# Patient Record
Sex: FEMALE | Race: WHITE | NOT HISPANIC OR LATINO | Employment: PART TIME | URBAN - METROPOLITAN AREA
[De-identification: names, ages, dates, MRNs, and addresses within clinical notes are randomized per-mention and may not be internally consistent; named-entity substitution may affect disease eponyms.]

---

## 2018-01-19 ENCOUNTER — TRANSFERRED RECORDS (OUTPATIENT)
Dept: HEALTH INFORMATION MANAGEMENT | Facility: CLINIC | Age: 18
End: 2018-01-19

## 2018-07-31 ENCOUNTER — TRANSFERRED RECORDS (OUTPATIENT)
Dept: HEALTH INFORMATION MANAGEMENT | Facility: CLINIC | Age: 18
End: 2018-07-31

## 2018-08-14 ENCOUNTER — TRANSFERRED RECORDS (OUTPATIENT)
Dept: HEALTH INFORMATION MANAGEMENT | Facility: CLINIC | Age: 18
End: 2018-08-14

## 2022-06-03 ENCOUNTER — LAB REQUISITION (OUTPATIENT)
Dept: LAB | Facility: CLINIC | Age: 22
End: 2022-06-03

## 2022-06-03 PROCEDURE — 86481 TB AG RESPONSE T-CELL SUSP: CPT | Performed by: INTERNAL MEDICINE

## 2022-06-06 LAB
GAMMA INTERFERON BACKGROUND BLD IA-ACNC: 0.03 IU/ML
M TB IFN-G BLD-IMP: NEGATIVE
M TB IFN-G CD4+ BCKGRND COR BLD-ACNC: 9.97 IU/ML
MITOGEN IGNF BCKGRD COR BLD-ACNC: 0 IU/ML
MITOGEN IGNF BCKGRD COR BLD-ACNC: 0 IU/ML
QUANTIFERON MITOGEN: 10 IU/ML
QUANTIFERON NIL TUBE: 0.03 IU/ML
QUANTIFERON TB1 TUBE: 0.03 IU/ML
QUANTIFERON TB2 TUBE: 0.03

## 2022-06-28 ENCOUNTER — OFFICE VISIT (OUTPATIENT)
Dept: ALLERGY | Facility: CLINIC | Age: 22
End: 2022-06-28
Payer: OTHER GOVERNMENT

## 2022-06-28 VITALS — RESPIRATION RATE: 16 BRPM | WEIGHT: 139.8 LBS | HEART RATE: 79 BPM | OXYGEN SATURATION: 99 %

## 2022-06-28 DIAGNOSIS — J30.89 ALLERGIC RHINITIS DUE TO DUST MITE: Primary | ICD-10-CM

## 2022-06-28 DIAGNOSIS — J30.1 SEASONAL ALLERGIC RHINITIS DUE TO POLLEN: ICD-10-CM

## 2022-06-28 PROCEDURE — 95004 PERQ TESTS W/ALRGNC XTRCS: CPT | Performed by: ALLERGY & IMMUNOLOGY

## 2022-06-28 PROCEDURE — 99204 OFFICE O/P NEW MOD 45 MIN: CPT | Mod: 25 | Performed by: ALLERGY & IMMUNOLOGY

## 2022-06-28 RX ORDER — CETIRIZINE HYDROCHLORIDE 10 MG/1
10 TABLET ORAL AT BEDTIME
COMMUNITY
End: 2023-08-22

## 2022-06-28 RX ORDER — SPIRONOLACTONE 25 MG/1
25 TABLET ORAL AT BEDTIME
COMMUNITY
End: 2022-10-03

## 2022-06-28 RX ORDER — ESCITALOPRAM OXALATE 5 MG/1
5 TABLET ORAL AT BEDTIME
COMMUNITY
End: 2022-10-03

## 2022-06-28 RX ORDER — MONTELUKAST SODIUM 10 MG/1
10 TABLET ORAL AT BEDTIME
Qty: 30 TABLET | Refills: 1 | Status: SHIPPED | OUTPATIENT
Start: 2022-06-28 | End: 2022-08-25

## 2022-06-28 RX ORDER — SPIRONOLACTONE 50 MG/1
50 TABLET, FILM COATED ORAL EVERY MORNING
COMMUNITY
End: 2022-10-03

## 2022-06-28 NOTE — PATIENT INSTRUCTIONS
Astelin 2 sprays each nostril twice neel y    Montelukast 10 mg for 1 month    Can consider allergy shots

## 2022-06-28 NOTE — LETTER
6/28/2022         RE: Ivet Ortiz  [address]  [place] [address]        Dear Colleague,    Thank you for referring your patient, Ivet Ortiz, to the [facility]. Please see a copy of my visit note below.        Subjective   Ivet is a 21 year old, presenting for the following health issues:  Allergy Consult      HPI       Chief complaint: Allergies    History of present illness: This is a pleasant 21-year-old woman here today to discuss allergies.  Patient states that she recently moved to [place] last August.  She moved from [place].  She states that since March 2020 she went to spring break in [place].  There she developed allergy symptoms and since that time she states she had year-round allergy symptoms.  She states she will itchy throat itchy mouth, she develops nasal congestion and drainage.  She states symptoms can occur throughout the year but here in [place] they seem to improve during the winter.  She was seen by what sounds like an ENT in [place] and had specific IgE testing done.  She has results with her today for which I was able to review.  Positive test to grass pollen as well as juniper.  She reports this spring she had some symptoms.  She is taking Zyrtec.  She was Flonase but it caused her nasal bleeding.  She has now been using Astelin nasal spray which seems to help more.  No history of asthma, denies any cough, wheeze or shortness of breath.  She states when she was 14 years of age she had allergy testing as she was having mouth swelling with certain foods.  She was not able to identify certain foods and has not noted if it was fresh fruits and vegetables.  She states this has resolved, however.  She previously carry an EpiPen for this.    Past medical history: Otherwise unremarkable    Social history: She moved here recently from [place], and she has a pets, and was just recently purchased a pet a few months ago, however.  She states  that she lives in a new apartment building, she is the first tenants of the apartments, carpeting, no exposure to mold        Review of Systems         Objective    Pulse 79   Resp 16   Wt 63.4 kg (139 lb 12.8 oz)   SpO2 99%   There is no height or weight on file to calculate BMI.  Physical Exam      Gen: Pleasant female not in acute distress  HEENT: Eyes no erythema of the bulbar or palpebral conjunctiva, no edema.  Nose: No congestion, mucosa normal. Mouth: Throat clear, no lip or tongue edema.     Respiratory: Clear to auscultation bilaterally, no adventitious breath sounds    Skin: No rashes or lesions  Psych: Alert and oriented times 3    30 percutaneous test were placed to the environmental skin test panel.  Positive histamine control with positive to grass pollen, weed pollen and df dust mite.  Please see scanned photograph.    Impression report and plan:  1.  Allergic rhinitis    Reviewed environmental control.  Recommended 1 month trial of montelukast.  I did caution the patient to the rare side effect of mood disturbance that she does take Lexapro.  Continue with Astelin nasal spray.  If symptoms do not improve, she could consider allergy shots.  Follow-up as needed.        .  ..      Again, thank you for allowing me to participate in the care of your patient.        Sincerely,        Es LYNN MD

## 2022-06-28 NOTE — PROGRESS NOTES
Ness Cooney is a 21 year old, presenting for the following health issues:  Allergy Consult      HPI       Chief complaint: Allergies    History of present illness: This is a pleasant 21-year-old woman here today to discuss allergies.  Patient states that she recently moved to [place] last August.  She moved from [place].  She states that since March 2020 she went to spring break in [place].  There she developed allergy symptoms and since that time she states she had year-round allergy symptoms.  She states she will itchy throat itchy mouth, she develops nasal congestion and drainage.  She states symptoms can occur throughout the year but here in [place] they seem to improve during the winter.  She was seen by what sounds like an ENT in [place] and had specific IgE testing done.  She has results with her today for which I was able to review.  Positive test to grass pollen as well as juniper.  She reports this spring she had some symptoms.  She is taking Zyrtec.  She was Flonase but it caused her nasal bleeding.  She has now been using Astelin nasal spray which seems to help more.  No history of asthma, denies any cough, wheeze or shortness of breath.  She states when she was 14 years of age she had allergy testing as she was having mouth swelling with certain foods.  She was not able to identify certain foods and has not noted if it was fresh fruits and vegetables.  She states this has resolved, however.  She previously carry an EpiPen for this.    Past medical history: Otherwise unremarkable    Social history: She moved here recently from [place], and she has a pets, and was just recently purchased a pet a few months ago, however.  She states that she lives in a new apartment building, she is the first tenants of the apartments, carpeting, no exposure to mold        Review of Systems         Objective    Pulse 79   Resp 16   Wt 63.4 kg (139 lb 12.8 oz)   SpO2 99%   There is no height or weight on  file to calculate BMI.  Physical Exam      Gen: Pleasant female not in acute distress  HEENT: Eyes no erythema of the bulbar or palpebral conjunctiva, no edema.  Nose: No congestion, mucosa normal. Mouth: Throat clear, no lip or tongue edema.     Respiratory: Clear to auscultation bilaterally, no adventitious breath sounds    Skin: No rashes or lesions  Psych: Alert and oriented times 3    30 percutaneous test were placed to the environmental skin test panel.  Positive histamine control with positive to grass pollen, weed pollen and df dust mite.  Please see scanned photograph.    Impression report and plan:  1.  Allergic rhinitis    Reviewed environmental control.  Recommended 1 month trial of montelukast.  I did caution the patient to the rare side effect of mood disturbance that she does take Lexapro.  Continue with Astelin nasal spray.  If symptoms do not improve, she could consider allergy shots.  Follow-up as needed.        .  ..

## 2022-07-05 ENCOUNTER — TELEPHONE (OUTPATIENT)
Dept: ALLERGY | Facility: CLINIC | Age: 22
End: 2022-07-05

## 2022-07-05 NOTE — TELEPHONE ENCOUNTER
Name Of Person Who Called: Ivet devine (patient)    Reason for call: patient waiting for medication prescription.      Best Phone Number To Call Back: Cell number on file:    Telephone Information:   Mobile [phone]       Okay To Leave A Detailed Voicemail? Yes

## 2022-07-07 ENCOUNTER — TELEPHONE (OUTPATIENT)
Dept: ALLERGY | Facility: CLINIC | Age: 22
End: 2022-07-07

## 2022-08-25 DIAGNOSIS — J30.89 ALLERGIC RHINITIS DUE TO DUST MITE: ICD-10-CM

## 2022-08-25 DIAGNOSIS — J30.1 SEASONAL ALLERGIC RHINITIS DUE TO POLLEN: ICD-10-CM

## 2022-08-25 RX ORDER — MONTELUKAST SODIUM 10 MG/1
10 TABLET ORAL AT BEDTIME
Qty: 30 TABLET | Refills: 1 | Status: SHIPPED | OUTPATIENT
Start: 2022-08-25 | End: 2022-10-21

## 2022-10-03 ENCOUNTER — HEALTH MAINTENANCE LETTER (OUTPATIENT)
Age: 22
End: 2022-10-03

## 2022-10-03 ENCOUNTER — OFFICE VISIT (OUTPATIENT)
Dept: FAMILY MEDICINE | Facility: CLINIC | Age: 22
End: 2022-10-03
Payer: OTHER GOVERNMENT

## 2022-10-03 VITALS
DIASTOLIC BLOOD PRESSURE: 83 MMHG | SYSTOLIC BLOOD PRESSURE: 118 MMHG | OXYGEN SATURATION: 100 % | TEMPERATURE: 98.4 F | HEIGHT: 65 IN | WEIGHT: 137 LBS | BODY MASS INDEX: 22.82 KG/M2

## 2022-10-03 DIAGNOSIS — Z11.3 SCREEN FOR STD (SEXUALLY TRANSMITTED DISEASE): ICD-10-CM

## 2022-10-03 DIAGNOSIS — F41.1 GAD (GENERALIZED ANXIETY DISORDER): ICD-10-CM

## 2022-10-03 DIAGNOSIS — F33.42 RECURRENT MAJOR DEPRESSIVE DISORDER, IN FULL REMISSION (H): ICD-10-CM

## 2022-10-03 DIAGNOSIS — E78.2 MIXED HYPERLIPIDEMIA: ICD-10-CM

## 2022-10-03 DIAGNOSIS — L70.0 ACNE VULGARIS: ICD-10-CM

## 2022-10-03 DIAGNOSIS — K13.0 LIP LESION: Primary | ICD-10-CM

## 2022-10-03 DIAGNOSIS — R79.89 ELEVATED LFTS: ICD-10-CM

## 2022-10-03 DIAGNOSIS — R79.89: ICD-10-CM

## 2022-10-03 LAB
ERYTHROCYTE [DISTWIDTH] IN BLOOD BY AUTOMATED COUNT: 12.1 % (ref 10–15)
ESTRADIOL SERPL-MCNC: 48 PG/ML
HCT VFR BLD AUTO: 42 % (ref 35–47)
HGB BLD-MCNC: 14.9 G/DL (ref 11.7–15.7)
MCH RBC QN AUTO: 31.2 PG (ref 26.5–33)
MCHC RBC AUTO-ENTMCNC: 35.5 G/DL (ref 31.5–36.5)
MCV RBC AUTO: 88 FL (ref 78–100)
PLATELET # BLD AUTO: 224 10E3/UL (ref 150–450)
RBC # BLD AUTO: 4.78 10E6/UL (ref 3.8–5.2)
WBC # BLD AUTO: 4.7 10E3/UL (ref 4–11)

## 2022-10-03 PROCEDURE — 85027 COMPLETE CBC AUTOMATED: CPT | Performed by: PHYSICIAN ASSISTANT

## 2022-10-03 PROCEDURE — 36415 COLL VENOUS BLD VENIPUNCTURE: CPT | Performed by: PHYSICIAN ASSISTANT

## 2022-10-03 PROCEDURE — 82670 ASSAY OF TOTAL ESTRADIOL: CPT | Performed by: PHYSICIAN ASSISTANT

## 2022-10-03 PROCEDURE — 87491 CHLMYD TRACH DNA AMP PROBE: CPT | Performed by: PHYSICIAN ASSISTANT

## 2022-10-03 PROCEDURE — 99204 OFFICE O/P NEW MOD 45 MIN: CPT | Performed by: PHYSICIAN ASSISTANT

## 2022-10-03 PROCEDURE — 80053 COMPREHEN METABOLIC PANEL: CPT | Performed by: PHYSICIAN ASSISTANT

## 2022-10-03 PROCEDURE — 80061 LIPID PANEL: CPT | Performed by: PHYSICIAN ASSISTANT

## 2022-10-03 RX ORDER — ESCITALOPRAM OXALATE 5 MG/1
TABLET ORAL
Qty: 90 TABLET | Refills: 3 | Status: SHIPPED | OUTPATIENT
Start: 2022-10-03 | End: 2023-11-08

## 2022-10-03 RX ORDER — SPIRONOLACTONE 50 MG/1
50 TABLET, FILM COATED ORAL EVERY MORNING
Qty: 90 TABLET | Refills: 1 | Status: SHIPPED | OUTPATIENT
Start: 2022-10-03 | End: 2023-02-07

## 2022-10-03 RX ORDER — SPIRONOLACTONE 25 MG/1
25 TABLET ORAL AT BEDTIME
Qty: 90 TABLET | Refills: 1 | Status: SHIPPED | OUTPATIENT
Start: 2022-10-03 | End: 2023-02-07

## 2022-10-03 ASSESSMENT — PAIN SCALES - GENERAL: PAINLEVEL: NO PAIN (0)

## 2022-10-03 NOTE — PROGRESS NOTES
Assessment & Plan   Problem List Items Addressed This Visit    None     Visit Diagnoses     Lip lesion    -  Primary    Relevant Orders    Adult Dermatology Referral    Elevated LFTs        Relevant Orders    Lipid panel reflex to direct LDL Fasting    Comprehensive metabolic panel (BMP + Alb, Alk Phos, ALT, AST, Total. Bili, TP)    CBC with platelets (Completed)    Mixed hyperlipidemia        Relevant Orders    Lipid panel reflex to direct LDL Fasting    Serum estradiol <50 pg/ml        Relevant Orders    Estradiol    Recurrent major depressive disorder, in full remission (H)        Relevant Medications    escitalopram (LEXAPRO) 5 MG tablet    CORRINE (generalized anxiety disorder)        Relevant Medications    escitalopram (LEXAPRO) 5 MG tablet    Acne vulgaris        Relevant Medications    spironolactone (ALDACTONE) 25 MG tablet    spironolactone (ALDACTONE) 50 MG tablet    Screen for STD (sexually transmitted disease)        Relevant Orders    Chlamydia trachomatis PCR         Lip lesion - this has been present for 10 years with minimal change.  I would like her to see dermatology for evaluation.  No rush to schedule based on lesion appearance and duration.   Refill medications - Lexapro 5 mg daily, ok to increase to 10 mg daily in winter months.   Spironolactone - continue for acne.  K+ ordered today.   Hx of low estrogen.  Recheck estradiol today.   Hx if elevated LFTs and lipids - recheck   Due for annual chlamydia screen.                    Return in about 4 months (around 2/3/2023) for Preventive Care Visit with Pap.    [facility] PRAIRIFREDDIE Cooney is a 22 year old, presenting for the following health issues:  Mouth/Lip Problem (Freckles )      Mouth/Lip Problem    History of Present Illness       Reason for visit:  Freckle check on lip, dentist told me I need it checked  Symptom onset:  More than a month  Symptoms include:  No symptoms, Yoana had  "this freckle as long as I can remember  Symptom progression:  Staying the same    She eats 4 or more servings of fruits and vegetables daily.She consumes 0 sweetened beverage(s) daily.She exercises with enough effort to increase her heart rate 30 to 60 minutes per day.  She exercises with enough effort to increase her heart rate 6 days per week.   She is taking medications regularly.       Unsure if its changed - had it for about 10 years  Light brown lesion x2 on the lower lip  Uses timothy bees lip balm without SPF  No family history of skin cancer    Other concerns  History of elevated LFT, high cholesterol   History of low estrogen  FH grandfather  of MI age 50s    Previous twice a day workouts  At that time - amenorrhea concern  Previously took OCP  Now she has regular periods  Healthy weight     She takes vitamin D in the winter  Calcium and multi w/ iron all year            Review of Systems         Objective    /83   Temp 98.4  F (36.9  C) (Temporal)   Ht 1.661 m (5' 5.4\")   Wt 62.1 kg (137 lb)   SpO2 100%   BMI 22.52 kg/m    Body mass index is 22.52 kg/m .  Physical Exam  Constitutional:       General: She is not in acute distress.     Appearance: She is well-developed. She is not diaphoretic.   HENT:      Head: Normocephalic.      Right Ear: External ear normal.      Left Ear: External ear normal.      Nose: Nose normal.      Mouth/Throat:        Comments: Hyperpigmented, round, 3 mm lesions  Eyes:      Conjunctiva/sclera: Conjunctivae normal.   Pulmonary:      Effort: Pulmonary effort is normal.   Musculoskeletal:      Cervical back: Normal range of motion.   Neurological:      Mental Status: She is alert and oriented to person, place, and time.   Psychiatric:         Judgment: Judgment normal.            Results for orders placed or performed in visit on 10/03/22 (from the past 24 hour(s))   CBC with platelets   Result Value Ref Range    WBC Count 4.7 4.0 - 11.0 10e3/uL    RBC Count 4.78 " 3.80 - 5.20 10e6/uL    Hemoglobin 14.9 11.7 - 15.7 g/dL    Hematocrit 42.0 35.0 - 47.0 %    MCV 88 78 - 100 fL    MCH 31.2 26.5 - 33.0 pg    MCHC 35.5 31.5 - 36.5 g/dL    RDW 12.1 10.0 - 15.0 %    Platelet Count 224 150 - 450 10e3/uL

## 2022-10-04 LAB
ALBUMIN SERPL-MCNC: 4.1 G/DL (ref 3.4–5)
ALP SERPL-CCNC: 57 U/L (ref 40–150)
ALT SERPL W P-5'-P-CCNC: 18 U/L (ref 0–50)
ANION GAP SERPL CALCULATED.3IONS-SCNC: 9 MMOL/L (ref 3–14)
AST SERPL W P-5'-P-CCNC: 18 U/L (ref 0–45)
BILIRUB SERPL-MCNC: 0.6 MG/DL (ref 0.2–1.3)
BUN SERPL-MCNC: 12 MG/DL (ref 7–30)
C TRACH DNA SPEC QL NAA+PROBE: NEGATIVE
CALCIUM SERPL-MCNC: 8.9 MG/DL (ref 8.5–10.1)
CHLORIDE BLD-SCNC: 105 MMOL/L (ref 94–109)
CHOLEST SERPL-MCNC: 179 MG/DL
CO2 SERPL-SCNC: 25 MMOL/L (ref 20–32)
CREAT SERPL-MCNC: 0.67 MG/DL (ref 0.52–1.04)
FASTING STATUS PATIENT QL REPORTED: NO
GFR SERPL CREATININE-BSD FRML MDRD: >90 ML/MIN/1.73M2
GLUCOSE BLD-MCNC: 77 MG/DL (ref 70–99)
HDLC SERPL-MCNC: 55 MG/DL
LDLC SERPL CALC-MCNC: 109 MG/DL
NONHDLC SERPL-MCNC: 124 MG/DL
POTASSIUM BLD-SCNC: 3.7 MMOL/L (ref 3.4–5.3)
PROT SERPL-MCNC: 7.4 G/DL (ref 6.8–8.8)
SODIUM SERPL-SCNC: 139 MMOL/L (ref 133–144)
TRIGL SERPL-MCNC: 76 MG/DL

## 2022-10-05 DIAGNOSIS — F33.42 RECURRENT MAJOR DEPRESSIVE DISORDER, IN FULL REMISSION (H): ICD-10-CM

## 2022-10-05 DIAGNOSIS — F41.1 GAD (GENERALIZED ANXIETY DISORDER): ICD-10-CM

## 2022-10-05 NOTE — RESULT ENCOUNTER NOTE
Ivet    Your lab tests are complete and I have reviewed the results.     - Your lab results look great; everything is normal.    The estradiol is on the low side of normal.  I am not worried about this, since you are now having regular periods.  If you notice any change in your menstrual cycle (missed periods or an irregular cycle) we can check the estradiol again as needed.     If you have any questions or concerns, please feel free to call or send a cFares message.    Sincerely,  Andrea Lopez PA-C

## 2022-10-07 ENCOUNTER — MYC MEDICAL ADVICE (OUTPATIENT)
Dept: FAMILY MEDICINE | Facility: CLINIC | Age: 22
End: 2022-10-07

## 2022-10-07 RX ORDER — ESCITALOPRAM OXALATE 5 MG/1
TABLET ORAL
Qty: 135 TABLET | OUTPATIENT
Start: 2022-10-07

## 2022-10-07 NOTE — TELEPHONE ENCOUNTER
This refill request is a duplicate request, previously received or sent.  Sent denial notification to pharmacy.  Eileen Silveira RN  [facility] Triage Team

## 2022-10-21 DIAGNOSIS — J30.1 SEASONAL ALLERGIC RHINITIS DUE TO POLLEN: ICD-10-CM

## 2022-10-21 DIAGNOSIS — J30.89 ALLERGIC RHINITIS DUE TO DUST MITE: ICD-10-CM

## 2022-10-21 RX ORDER — MONTELUKAST SODIUM 10 MG/1
10 TABLET ORAL AT BEDTIME
Qty: 90 TABLET | Refills: 1 | Status: SHIPPED | OUTPATIENT
Start: 2022-10-21 | End: 2023-02-07

## 2022-12-29 ENCOUNTER — VIRTUAL VISIT (OUTPATIENT)
Dept: FAMILY MEDICINE | Facility: CLINIC | Age: 22
End: 2022-12-29
Payer: OTHER GOVERNMENT

## 2022-12-29 DIAGNOSIS — R42 VERTIGO: Primary | ICD-10-CM

## 2022-12-29 PROCEDURE — 99213 OFFICE O/P EST LOW 20 MIN: CPT | Mod: 95 | Performed by: PHYSICIAN ASSISTANT

## 2022-12-29 ASSESSMENT — PATIENT HEALTH QUESTIONNAIRE - PHQ9
SUM OF ALL RESPONSES TO PHQ QUESTIONS 1-9: 2
SUM OF ALL RESPONSES TO PHQ QUESTIONS 1-9: 2
10. IF YOU CHECKED OFF ANY PROBLEMS, HOW DIFFICULT HAVE THESE PROBLEMS MADE IT FOR YOU TO DO YOUR WORK, TAKE CARE OF THINGS AT HOME, OR GET ALONG WITH OTHER PEOPLE: NOT DIFFICULT AT ALL

## 2022-12-29 NOTE — PROGRESS NOTES
"Ivet is a 22 year old who is being evaluated via a billable video visit.      How would you like to obtain your AVS? MyChart  If the video visit is dropped, the invitation should be resent by: Text to cell phone: [phone]  Will anyone else be joining your video visit? No        Assessment & Plan     Vertigo  Discussed meclizine, has on hand already from previous episodes.  Trial of vestibular therapy. If no improvement recommend neurology consult.   - Physical Therapy Referral; Future  No follow-ups on file.    Jayashree Clemente PA-C  [facility]    Ness Cooney is a 22 year old, presenting for the following health issues:  Dizziness      History of Present Illness       Reason for visit:  Vertigo  Symptom onset:  3-4 weeks ago  Symptoms include:  World starts spinning for a couple min randomly OR I just feel off and dizzy when I look to the side throughout day. not new and happened [id number] (went to dr but never figured out cause) then went away so assumed it was due to anxiety but now unsure  Symptom intensity:  Mild  Symptom progression:  Staying the same  Had these symptoms before:  Yes  Has tried/received treatment for these symptoms:  No    She eats 4 or more servings of fruits and vegetables daily.She consumes 0 sweetened beverage(s) daily.She exercises with enough effort to increase her heart rate 30 to 60 minutes per day.  She exercises with enough effort to increase her heart rate 5 days per week.   She is taking medications regularly.       Review of Systems   Constitutional, HEENT, cardiovascular, pulmonary, gi and gu systems are negative, except as otherwise noted.      Objective    Vitals - Patient Reported  Weight (Patient Reported): 62.6 kg (138 lb)  Height (Patient Reported): 165.1 cm (5' 5\")  BMI (Based on Pt Reported Ht/Wt): 22.96      Vitals:  No vitals were obtained today due to virtual visit.    Physical Exam   GENERAL: Healthy, alert and no " distress  EYES: Eyes grossly normal to inspection.  No discharge or erythema, or obvious scleral/conjunctival abnormalities.  RESP: No audible wheeze, cough, or visible cyanosis.  No visible retractions or increased work of breathing.    SKIN: Visible skin clear. No significant rash, abnormal pigmentation or lesions.  NEURO: Cranial nerves grossly intact.  Mentation and speech appropriate for age.  PSYCH: Mentation appears normal, affect normal/bright, judgement and insight intact, normal speech and appearance well-groomed.                Video-Visit Details    Type of service:  Video Visit     Originating Location (pt. Location): Home    Distant Location (provider location):  On-site  Platform used for Video Visit: Mallorie

## 2023-01-12 ENCOUNTER — HOSPITAL ENCOUNTER (OUTPATIENT)
Dept: PHYSICAL THERAPY | Facility: CLINIC | Age: 23
Discharge: HOME | End: 2023-01-12
Attending: PHYSICIAN ASSISTANT
Payer: OTHER GOVERNMENT

## 2023-01-12 DIAGNOSIS — R42 DIZZINESS: Primary | ICD-10-CM

## 2023-01-12 DIAGNOSIS — R42 VERTIGO: ICD-10-CM

## 2023-01-12 PROCEDURE — 97162 PT EVAL MOD COMPLEX 30 MIN: CPT | Mod: GP

## 2023-01-12 NOTE — PROGRESS NOTES
01/12/23 1559   Quick Adds   Quick Adds Vestibular Eval   Type of Visit Initial OP PT Evaluation   General Information   Start of Care Date 01/12/23   Referring Physician Jayashree Clemente, KRISSY   Orders Evaluate and Treat as Indicated   Order Date 12/29/22   Medical Diagnosis Vertigo   Onset of illness/injury or Date of Surgery 12/29/22   Surgical/Medical history reviewed Yes   Pertinent history of current problem (include personal factors and/or comorbidities that impact the POC) Patient reports to OP PT with episodic dizziness. Patient reports having a history of headaches throughout high school/college. Saw a neurologist for headaches/migraines (unsure which) in 2021 and an ENT for allergies in 2020. No longer experiencing the regular headaches but does have them occassionally. Reports that dizziness lasts minutes when it occurs and descibres as spinny sensation. Currently occurring a couple times per week, started again a month ago. Has had similar episodes since 2018. Reports the episodes can happen with head turns but also sometimes when sitting still/no movement. Closing eyes and lying down helps symptoms. Denies tinnintis, vision changes, sensory changes, strength changes, motion sensitvity or any HA associated with dizziness.   Pertinent Visual History  denies vision changes   Prior level of function comment Active, works up regularily   Patient role/Employment history Employed  (works as dietician)   Patient/Family Goals Statement improve dizziness   Fall Risk Screen   Fall screen completed by PT   Have you fallen 2 or more times in the past year? No   Have you fallen and had an injury in the past year? No   System Outcome Measures   Outcome Measures BPPV   Dizziness Handicap Inventory (score out of 100) A decrease in score by 17.18 or greater indicates a clinically significant change in symptoms. 8   Cognitive Status Examination   Cognitive Comment alert, follows commands   Posture   Posture Normal    Strength   Strength Comments no defecits noted with functional movement   Gait   Gait Comments good step length and speed, no gait deviations   Balance   Balance no deficits were identified   Balance Comments SL stance >20 seconds bilaterally, stopped by PT due to no postural sway   Sensory Examination   Sensory Perception no deficits were identified   Cervicogenic Screen   Neck ROM WNL   Oculomotor Exam   Smooth Pursuit Normal   Saccades Normal   VOR Normal   VOR Cancellation Normal   VOR Cancellation Comments no dizziness, even with faster movements   Rapid Head Thrust Normal   Convergence Testing Normal   Infrared Goggle Exam or Frenzel Lense Exam   Vestibular Suppressant in Last 24 Hours? Yes   Exam completed with Infrared Goggles   Spontaneous Nystagmus Negative   Gaze Evoked Nystagmus Negative   Head Shake Horizontal Nystagmus Negative   Head Shake Horizontal Nystagmus comments no dizziness   Kathy-Hallpike (right) Negative   Chaplin-Hallpike (right) comments loaded DHP   Chaplin-Hallpike (Left) Negative   Kathy-Hallpike (left) comments loaded DHP   HSCC Supine Roll Test (Right) Negative   HSCC Supine Roll Test (Left) Negative   Dynamic Visual Acuity (DVA)   Static Acuity (LogMar) -0.1   Horizontal Head Movement at 2 Hz (LogMar) 0.1   DVA Comments 2 line deviation, seated, within normal range   Clinical Impression   Criteria for Skilled Therapeutic Interventions Met evaluation only   Clinical Impression Comments Patient presents to OP PT with symptoms of episodic vertigo that last 20 seconds to 3 minutes without any additional symptoms. Unable to elicit symptoms and overall no significant findings with vestibular testing in session today suggesting low probability of peripheral cause of dizziness. Patient with history of headaches  (possible migraines) which have been occurring less frequently but still present. I believe the patient would most benefit from a new neurology consulation to further assess for etiology of  current vertigo symptoms prior to any treatment. Patient may benefit from some habituation training in the future if indicated.   Total Evaluation Time   PT Eval, Moderate Complexity Minutes (35105) 40

## 2023-01-31 ASSESSMENT — ENCOUNTER SYMPTOMS
PALPITATIONS: 0
NERVOUS/ANXIOUS: 0
HEARTBURN: 0
CONSTIPATION: 0
COUGH: 0
JOINT SWELLING: 0
EYE PAIN: 0
ARTHRALGIAS: 0
HEADACHES: 0
CHILLS: 0
NAUSEA: 0
HEMATURIA: 0
SORE THROAT: 0
BREAST MASS: 0
DYSURIA: 0
MYALGIAS: 0
PARESTHESIAS: 0
DIARRHEA: 0
HEMATOCHEZIA: 0
ABDOMINAL PAIN: 0
FREQUENCY: 0
FEVER: 0
WEAKNESS: 0
SHORTNESS OF BREATH: 0
DIZZINESS: 0

## 2023-02-07 ENCOUNTER — OFFICE VISIT (OUTPATIENT)
Dept: FAMILY MEDICINE | Facility: CLINIC | Age: 23
End: 2023-02-07
Payer: OTHER GOVERNMENT

## 2023-02-07 VITALS
DIASTOLIC BLOOD PRESSURE: 77 MMHG | RESPIRATION RATE: 20 BRPM | SYSTOLIC BLOOD PRESSURE: 123 MMHG | OXYGEN SATURATION: 98 % | BODY MASS INDEX: 23.29 KG/M2 | HEIGHT: 65 IN | TEMPERATURE: 97.8 F | HEART RATE: 75 BPM | WEIGHT: 139.8 LBS

## 2023-02-07 DIAGNOSIS — Z00.00 ENCOUNTER FOR ROUTINE ADULT HEALTH EXAMINATION WITHOUT ABNORMAL FINDINGS: Primary | ICD-10-CM

## 2023-02-07 DIAGNOSIS — L70.0 ACNE VULGARIS: ICD-10-CM

## 2023-02-07 DIAGNOSIS — J30.89 ALLERGIC RHINITIS DUE TO DUST MITE: ICD-10-CM

## 2023-02-07 DIAGNOSIS — J30.1 SEASONAL ALLERGIC RHINITIS DUE TO POLLEN: ICD-10-CM

## 2023-02-07 DIAGNOSIS — Z12.4 CERVICAL CANCER SCREENING: ICD-10-CM

## 2023-02-07 PROCEDURE — G0145 SCR C/V CYTO,THINLAYER,RESCR: HCPCS | Performed by: PHYSICIAN ASSISTANT

## 2023-02-07 PROCEDURE — 99395 PREV VISIT EST AGE 18-39: CPT | Performed by: PHYSICIAN ASSISTANT

## 2023-02-07 PROCEDURE — 99214 OFFICE O/P EST MOD 30 MIN: CPT | Mod: 25 | Performed by: PHYSICIAN ASSISTANT

## 2023-02-07 RX ORDER — SPIRONOLACTONE 25 MG/1
75 TABLET ORAL DAILY
Qty: 270 TABLET | Refills: 1 | Status: SHIPPED | OUTPATIENT
Start: 2023-03-01 | End: 2023-10-30

## 2023-02-07 RX ORDER — MONTELUKAST SODIUM 10 MG/1
10 TABLET ORAL AT BEDTIME
Qty: 90 TABLET | Refills: 1 | Status: SHIPPED | OUTPATIENT
Start: 2023-03-01 | End: 2023-09-14

## 2023-02-07 ASSESSMENT — ENCOUNTER SYMPTOMS
CHILLS: 0
JOINT SWELLING: 0
ABDOMINAL PAIN: 0
DIARRHEA: 0
SORE THROAT: 0
HEARTBURN: 0
CONSTIPATION: 0
HEMATOCHEZIA: 0
COUGH: 0
WEAKNESS: 0
NERVOUS/ANXIOUS: 0
BREAST MASS: 0
EYE PAIN: 0
HEMATURIA: 0
NAUSEA: 0
MYALGIAS: 0
ARTHRALGIAS: 0
SHORTNESS OF BREATH: 0
HEADACHES: 0
FREQUENCY: 0
DYSURIA: 0
DIZZINESS: 0
FEVER: 0
PARESTHESIAS: 0
PALPITATIONS: 0

## 2023-02-07 ASSESSMENT — PAIN SCALES - GENERAL: PAINLEVEL: NO PAIN (0)

## 2023-02-07 NOTE — PROGRESS NOTES
SUBJECTIVE:   CC: Ivet is an 22 year old who presents for preventive health visit.   Patient has been advised of split billing requirements and indicates understanding: Yes  Healthy Habits:     Getting at least 3 servings of Calcium per day:  Yes    Bi-annual eye exam:  Yes    Dental care twice a year:  Yes    Sleep apnea or symptoms of sleep apnea:  None    Diet:  Regular (no restrictions)    Frequency of exercise:  6-7 days/week    Duration of exercise:  45-60 minutes    Taking medications regularly:  Yes    Medication side effects:  None    PHQ-2 Total Score: 0    Additional concerns today:  No      Today's PHQ-2 Score:   PHQ-2 ( 1999 [employer]) 1/31/2023   Q1: Little interest or pleasure in doing things 0   Q2: Feeling down, depressed or hopeless 0   PHQ-2 Score 0   Q1: Little interest or pleasure in doing things Not at all   Q2: Feeling down, depressed or hopeless Not at all   PHQ-2 Score 0       Have you ever done Advance Care Planning? (For example, a Health Directive, POLST, or a discussion with a medical provider or your loved ones about your wishes): No, advance care planning information given to patient to review.  Patient plans to discuss their wishes with loved ones or provider.      Social History     Tobacco Use     Smoking status: Never     Smokeless tobacco: Never   Substance Use Topics     Alcohol use: Not on file         Alcohol Use 1/31/2023   Prescreen: >3 drinks/day or >7 drinks/week? No       Reviewed orders with patient.  Reviewed health maintenance and updated orders accordingly - Yes  There is no problem list on file for this patient.    No past surgical history on file.    Social History     Tobacco Use     Smoking status: Never     Smokeless tobacco: Never   Substance Use Topics     Alcohol use: Not on file     Family History   Problem Relation Age of Onset     Hyperlipidemia Mother      Breast Cancer Mother      Hypertension Father          Current Outpatient Medications   Medication  "Sig Dispense Refill     cetirizine (ZYRTEC) 10 MG tablet Take 10 mg by mouth At Bedtime       escitalopram (LEXAPRO) 5 MG tablet Take 5 mg daily.  If depression and/or anxiety are worse in the winter she may take 10 mg daily. 90 tablet 3     [START ON 3/1/2023] montelukast (SINGULAIR) 10 MG tablet Take 1 tablet (10 mg) by mouth At Bedtime 90 tablet 1     [START ON 3/1/2023] spironolactone (ALDACTONE) 25 MG tablet Take 3 tablets (75 mg) by mouth daily 270 tablet 1     No Known Allergies    Breast Cancer Screening:        History of abnormal Pap smear: NO - age 21-29 PAP every 3 years recommended     Reviewed and updated as needed this visit by clinical staff   Tobacco  Allergies  Meds              Reviewed and updated as needed this visit by Provider                     Review of Systems   Constitutional: Negative for chills and fever.   HENT: Negative for congestion, ear pain, hearing loss and sore throat.    Eyes: Negative for pain and visual disturbance.   Respiratory: Negative for cough and shortness of breath.    Cardiovascular: Negative for chest pain, palpitations and peripheral edema.   Gastrointestinal: Negative for abdominal pain, constipation, diarrhea, heartburn, hematochezia and nausea.   Breasts:  Negative for tenderness, breast mass and discharge.   Genitourinary: Negative for dysuria, frequency, genital sores, hematuria, pelvic pain, urgency, vaginal bleeding and vaginal discharge.   Musculoskeletal: Negative for arthralgias, joint swelling and myalgias.   Skin: Negative for rash.   Neurological: Negative for dizziness, weakness, headaches and paresthesias.   Psychiatric/Behavioral: Negative for mood changes. The patient is not nervous/anxious.           OBJECTIVE:   /77   Pulse 75   Temp 97.8  F (36.6  C)   Resp 20   Ht 1.655 m (5' 5.16\")   Wt 63.4 kg (139 lb 12.8 oz)   LMP 01/14/2023   SpO2 98%   BMI 23.15 kg/m    Physical Exam  Constitutional:       General: She is not in acute " distress.     Appearance: She is well-developed.   HENT:      Right Ear: Tympanic membrane and external ear normal.      Left Ear: Tympanic membrane and external ear normal.      Nose: Nose normal.      Mouth/Throat:      Pharynx: No oropharyngeal exudate.   Eyes:      General:         Right eye: No discharge.         Left eye: No discharge.      Conjunctiva/sclera: Conjunctivae normal.      Pupils: Pupils are equal, round, and reactive to light.   Neck:      Thyroid: No thyromegaly.      Trachea: No tracheal deviation.   Cardiovascular:      Rate and Rhythm: Normal rate and regular rhythm.      Pulses: Normal pulses.      Heart sounds: Normal heart sounds, S1 normal and S2 normal. No murmur heard.    No friction rub. No S3 or S4 sounds.   Pulmonary:      Effort: Pulmonary effort is normal. No respiratory distress.      Breath sounds: Normal breath sounds. No wheezing or rales.   Abdominal:      Palpations: Abdomen is soft. There is no mass.      Tenderness: There is no abdominal tenderness.   Genitourinary:     Vagina: Normal.      Cervix: No cervical motion tenderness or discharge.   Musculoskeletal:         General: Normal range of motion.      Cervical back: Neck supple.   Lymphadenopathy:      Cervical: No cervical adenopathy.   Skin:     General: Skin is warm and dry.      Findings: No rash.   Neurological:      Mental Status: She is alert and oriented to person, place, and time.      Motor: No abnormal muscle tone.   Psychiatric:         Thought Content: Thought content normal.         Judgment: Judgment normal.               ASSESSMENT/PLAN:   Ivet was seen today for physical and establish care.    Diagnoses and all orders for this visit:    Encounter for routine adult health examination without abnormal findings    Cervical cancer screening  -     Pap Screen only - recommended age 21 - 24 years    Allergic rhinitis due to dust mite  -     montelukast (SINGULAIR) 10 MG tablet; Take 1 tablet (10 mg) by mouth  At Bedtime    Seasonal allergic rhinitis due to pollen  -     montelukast (SINGULAIR) 10 MG tablet; Take 1 tablet (10 mg) by mouth At Bedtime    Acne vulgaris  -     spironolactone (ALDACTONE) 25 MG tablet; Take 3 tablets (75 mg) by mouth daily      Preventive care was updated as above.   Labs and/or refills were updated as above.             COUNSELING:  Reviewed preventive health counseling, as reflected in patient instructions        She reports that she has never smoked. She has never used smokeless tobacco.          Gay Lopez PA-C  [facility]

## 2023-02-12 LAB
BKR LAB AP GYN ADEQUACY: NORMAL
BKR LAB AP GYN INTERPRETATION: NORMAL
BKR LAB AP HPV REFLEX: NO
BKR LAB AP PREVIOUS ABNORMAL: NORMAL
PATH REPORT.COMMENTS IMP SPEC: NORMAL
PATH REPORT.COMMENTS IMP SPEC: NORMAL
PATH REPORT.RELEVANT HX SPEC: NORMAL

## 2023-02-28 ENCOUNTER — OFFICE VISIT (OUTPATIENT)
Dept: FAMILY MEDICINE | Facility: CLINIC | Age: 23
End: 2023-02-28
Payer: OTHER GOVERNMENT

## 2023-02-28 DIAGNOSIS — D22.9 MULTIPLE BENIGN NEVI: ICD-10-CM

## 2023-02-28 DIAGNOSIS — Z12.83 SKIN CANCER SCREENING: ICD-10-CM

## 2023-02-28 DIAGNOSIS — L82.1 SEBORRHEIC KERATOSIS: ICD-10-CM

## 2023-02-28 DIAGNOSIS — L81.4 LENTIGO: Primary | ICD-10-CM

## 2023-02-28 DIAGNOSIS — L50.5 CHOLINERGIC URTICARIA: ICD-10-CM

## 2023-02-28 DIAGNOSIS — K13.0 LIP LESION: ICD-10-CM

## 2023-02-28 PROCEDURE — 99203 OFFICE O/P NEW LOW 30 MIN: CPT | Performed by: PHYSICIAN ASSISTANT

## 2023-02-28 ASSESSMENT — PAIN SCALES - GENERAL: PAINLEVEL: NO PAIN (0)

## 2023-02-28 NOTE — PROGRESS NOTES
[facility] Dermatology Note  Encounter Date: Feb 28, 2023  Office Visit     Dermatology Problem List:  1. Acne vulgaris  - Current tx: spironolactone, BPO wash, clindamycin, tretinoin  2. Labial lentigos, lower lip x2  - photo 2/28/2023  3. Cholinergic urticaria    Last FBSE: 2/28/2023  ____________________________________________    Assessment & Plan:    # Labial lentigos, lower lip x2  Discussed the natural history and benign nature of this lesion. Reassurance provided that no additional treatment is necessary.   - Photodocumentation today.  - Patient to monitor for future changes.  - Recommended Aquaphor chap stick with SPF.    # Cholinergic urticaria in a patient with chronic dust mite allergies.   - Recommended taking Zyrtec 10 mg BID on days when exercising.    # Seborrheic keratoses, R areola  Discussed the natural history and benign nature of this lesion. Reassurance provided that no additional treatment is necessary.     # Multiple benign nevi  - No concerning lesions today  - Monitor for ABCDEs of melanoma   - Continue sun protection - recommend SPF 30 or higher with frequent application   - Return sooner if noticing changing or symptomatic lesions    Procedures Performed:   None    Follow-up: 6 month(s) in-person, or earlier for new or changing lesions    Staff and Scribe:     Scribe Disclosure:  JOSSIE, Juan Lane, am serving as a scribe to document services personally performed by Bridget Becker PA-C based on data collection and the provider's statements to me.     Provider Disclosure:   The documentation recorded by the scribe accurately reflects the services I personally performed and the decisions made by me.    All risks, benefits and alternatives were discussed with patient.  Patient is in agreement and understands the assessment and plan.  All questions were answered.  Sun Screen Education was given.   Return to Clinic in 6 months or sooner as needed.   Bridget Becker  KRISSY   [facility]   ____________________________________________    CC: Skin Check ([facility]. [facility] )    HPI:  Ms. Ivet Ortiz is a(n) 22 year old female who presents today as a new patient for a spot her lower lip present for as long as she can remember, but brought to greater concern by her dentist in August 2022. It has never bothered her before or been symptomatic. Patient is also interested in receiving a skin check, but does not have any specific spots of concern. She received high sun exposure in her teen years, but now regularly wears sun screen. Patient lastly notes experiencing full-body itching after working out. She is on Singulair for allergies, which helps.     Patient is otherwise feeling well, without additional skin concerns.    Labs Reviewed:  N/A    Physical Exam:  Vitals: LMP 01/14/2023   SKIN: Focused examination of the lips was performed.  - 3 mm light brown macule on the L lower lip.  - 3 mm x 3 mm light brown macule on the R lower lip  - Multiple regular brown pigmented macules and papules are identified on the trunk and extremities.   - There are waxy stuck on tan to brown papules on the R areola.  - No other lesions of concern on areas examined.         Medications:  Current Outpatient Medications   Medication     cetirizine (ZYRTEC) 10 MG tablet     escitalopram (LEXAPRO) 5 MG tablet     [START ON 3/1/2023] montelukast (SINGULAIR) 10 MG tablet     [START ON 3/1/2023] spironolactone (ALDACTONE) 25 MG tablet     No current facility-administered medications for this visit.      Past Medical History:   There is no problem list on file for this patient.    Past Medical History:   Diagnosis Date     Depressive disorder         CC Gay Lopez PA-C  [address] [place] [address] on close of this encounter.

## 2023-02-28 NOTE — LETTER
2/28/2023         RE: Ivet Ortiz  [address]  [place] [address]        Dear Colleague,    Thank you for referring your patient, Ivet Ortiz, to the [facility] [place]. Please see a copy of my visit note below.    [facility] Dermatology Note  Encounter Date: Feb 28, 2023  Office Visit     Dermatology Problem List:  1. Acne vulgaris  - Current tx: spironolactone, BPO wash, clindamycin, tretinoin  2. Labial lentigos, lower lip x2  - photo 2/28/2023  3. Cholinergic urticaria    Last FBSE: 2/28/2023  ____________________________________________    Assessment & Plan:    # Labial lentigos, lower lip x2  Discussed the natural history and benign nature of this lesion. Reassurance provided that no additional treatment is necessary.   - Photodocumentation today.  - Patient to monitor for future changes.  - Recommended Aquaphor chap stick with SPF.    # Cholinergic urticaria in a patient with chronic dust mite allergies.   - Recommended taking Zyrtec 10 mg BID on days when exercising.    # Seborrheic keratoses, R areola  Discussed the natural history and benign nature of this lesion. Reassurance provided that no additional treatment is necessary.     # Multiple benign nevi  - No concerning lesions today  - Monitor for ABCDEs of melanoma   - Continue sun protection - recommend SPF 30 or higher with frequent application   - Return sooner if noticing changing or symptomatic lesions    Procedures Performed:   None    Follow-up: 6 month(s) in-person, or earlier for new or changing lesions    Staff and Scribe:     Scribe Disclosure:  I, Juan Lane, am serving as a scribe to document services personally performed by Bridget Becker PA-C based on data collection and the provider's statements to me.     Provider Disclosure:   The documentation recorded by the scribe accurately reflects the services I personally performed and the decisions made by me.    All risks,  benefits and alternatives were discussed with patient.  Patient is in agreement and understands the assessment and plan.  All questions were answered.  Sun Screen Education was given.   Return to Clinic in 6 months or sooner as needed.   Bridget Becker PA-C   [facility]   ____________________________________________    CC: Skin Check (Spot check on bottom lip. Poss [facility]? )    HPI:  Ms. Ivet Ortiz is a(n) 22 year old female who presents today as a new patient for a spot her lower lip present for as long as she can remember, but brought to greater concern by her dentist in August 2022. It has never bothered her before or been symptomatic. Patient is also interested in receiving a skin check, but does not have any specific spots of concern. She received high sun exposure in her teen years, but now regularly wears sun screen. Patient lastly notes experiencing full-body itching after working out. She is on Singulair for allergies, which helps.     Patient is otherwise feeling well, without additional skin concerns.    Labs Reviewed:  N/A    Physical Exam:  Vitals: LMP 01/14/2023   SKIN: Focused examination of the lips was performed.  - 3 mm light brown macule on the L lower lip.  - 3 mm x 3 mm light brown macule on the R lower lip  - Multiple regular brown pigmented macules and papules are identified on the trunk and extremities.   - There are waxy stuck on tan to brown papules on the R areola.  - No other lesions of concern on areas examined.         Medications:  Current Outpatient Medications   Medication     cetirizine (ZYRTEC) 10 MG tablet     escitalopram (LEXAPRO) 5 MG tablet     [START ON 3/1/2023] montelukast (SINGULAIR) 10 MG tablet     [START ON 3/1/2023] spironolactone (ALDACTONE) 25 MG tablet     No current facility-administered medications for this visit.      Past Medical History:   There is no problem list on file for this patient.    Past Medical History:   Diagnosis  Date     Depressive disorder         CC Gay Lopez PA-C  [address] [place] [address] on close of this encounter.      Again, thank you for allowing me to participate in the care of your patient.        Sincerely,        Bridget Becker PA-C

## 2023-02-28 NOTE — PATIENT INSTRUCTIONS
Patient Education     Checking for Skin Cancer  You can find cancer early by checking your skin each month. There are 3 kinds of skin cancer. They are melanoma, basal cell carcinoma, and squamous cell carcinoma. Doing monthly skin checks is the best way to find new marks or skin changes. Follow the instructions below for checking your skin.   The ABCDEs of checking moles for melanoma   Check your moles or growths for signs of melanoma using ABCDE:   Asymmetry: the sides of the mole or growth don t match  Border: the edges are ragged, notched, or blurred  Color: the color within the mole or growth varies  Diameter: the mole or growth is larger than 6 mm (size of a pencil eraser)  Evolving: the size, shape, or color of the mole or growth is changing (evolving is not shown in the images below)    Checking for other types of skin cancer  Basal cell carcinoma or squamous cell carcinoma have symptoms such as:     A spot or mole that looks different from all other marks on your skin  Changes in how an area feels, such as itching, tenderness, or pain  Changes in the skin's surface, such as oozing, bleeding, or scaliness  A sore that does not heal  New swelling or redness beyond the border of a mole    Who s at risk?  Anyone can get skin cancer. But you are at greater risk if you have:   Fair skin, light-colored hair, or light-colored eyes  Many moles or abnormal moles on your skin  A history of sunburns from sunlight or tanning beds  A family history of skin cancer  A history of exposure to radiation or chemicals  A weakened immune system  If you have had skin cancer in the past, you are at risk for recurring skin cancer.   How to check your skin  Do your monthly skin checkups in front of a full-length mirror. Check all parts of your body, including your:   Head (ears, face, neck, and scalp)  Torso (front, back, and sides)  Arms (tops, undersides, upper, and lower armpits)  Hands (palms, backs, and fingers, including  under the nails)  Buttocks and genitals  Legs (front, back, and sides)  Feet (tops, soles, toes, including under the nails, and between toes)  If you have a lot of moles, take digital photos of them each month. Make sure to take photos both up close and from a distance. These can help you see if any moles change over time.   Most skin changes are not cancer. But if you see any changes in your skin, call your doctor right away. Only he or she can diagnose a problem. If you have skin cancer, seeing your doctor can be the first step toward getting the treatment that could save your life.   [employer] last reviewed this educational content on 4/1/2019 2000-2020 [employer]. [address] [place] [address], [place] [address]. All rights reserved. This information is not intended as a substitute for professional medical care. Always follow your healthcare professional's instructions.       When should I call my doctor?  If you are worsening or not improving, please, contact us or seek urgent care as noted below.     Who should I call with questions (adults)?  [facility] (adult and pediatric): [phone]  [facility] (adult): [phone]  For urgent needs outside of business hours call the [facility] at [phone] and ask for the dermatology resident on call to be paged  If this is a medical emergency and you are unable to reach an ER, Call [phone]    Who should I call with questions (pediatric)?  [facility]- Pediatric Dermatology  Dr. Tasha Cantor, Dr. Jasmina Ham, Dr. Naomie Valentin, JACKELIN Sands, Dr. Gris Boswell, Dr. Olivia Jon & Dr. Celestine Beck  Non-urgent nurse triage line; [phone]- Danica and Pattie LOVE Care Coordinatoryoandy Aparicio (/Complex ) [phone]    If you need a prescription refill, please contact your pharmacy. Refills are approved or denied by our  Physicians during normal business hours, Monday through Fridays  Per office policy, refills will not be granted if you have not been seen within the past year (or sooner depending on your child's condition)    Scheduling Information:  Pediatric Appointment Scheduling and Call Center [phone]  Radiology Scheduling- [phone]  Sedation Unit Scheduling- [phone]  [place] Scheduling- General [phone]; Pediatric Dermatology [phone]  Main  Services: [phone]  Lithuanian: [phone]  Slovak: [phone]  Hmong/Faroese/Romansh: [phone]  Preadmission Nursing Department Fax Number: [phone] (Fax all pre-operative paperwork to this number)    For urgent matters arising during evenings, weekends, or holidays that cannot wait for normal business hours please call [phone] and ask for the dermatology resident on call to be paged.

## 2023-03-10 ENCOUNTER — OFFICE VISIT (OUTPATIENT)
Dept: NEUROLOGY | Facility: CLINIC | Age: 23
End: 2023-03-10
Attending: PHYSICIAN ASSISTANT
Payer: OTHER GOVERNMENT

## 2023-03-10 VITALS
SYSTOLIC BLOOD PRESSURE: 124 MMHG | BODY MASS INDEX: 22.69 KG/M2 | RESPIRATION RATE: 16 BRPM | HEART RATE: 76 BPM | WEIGHT: 137 LBS | DIASTOLIC BLOOD PRESSURE: 76 MMHG

## 2023-03-10 DIAGNOSIS — G43.809 OTHER MIGRAINE WITHOUT STATUS MIGRAINOSUS, NOT INTRACTABLE: ICD-10-CM

## 2023-03-10 DIAGNOSIS — R42 VERTIGO: Primary | ICD-10-CM

## 2023-03-10 DIAGNOSIS — R42 DIZZINESS: ICD-10-CM

## 2023-03-10 PROCEDURE — 99204 OFFICE O/P NEW MOD 45 MIN: CPT | Performed by: PSYCHIATRY & NEUROLOGY

## 2023-03-10 NOTE — LETTER
3/10/2023         RE: Ivet Ortiz  [address]  [place] [address]        Dear Colleague,    Thank you for referring your patient, Ivet Ortiz, to the [facility] [place]. Please see a copy of my visit note below.    NEUROLOGY OUTPATIENT CONSULT NOTE  Mar 10, 2023     CHIEF COMPLAINT/REASON FOR VISIT/REASON FOR CONSULT  Patient presents with:  Vertigo    REASON FOR CONSULTATION- Vertigo    REFERRAL SOURCE  Dr. Jayashree Clemente  CC Dr. Jayashree Clemente    HISTORY OF PRESENT ILLNESS  Ivet Ortiz is a 22 year old female seen today for evaluation of vertigo.  She has been having vertigo since 2018.  At that time she had a ear infection.  Does not remember which side of the ear.  She was treated through urgent care with antibiotics.  Does not have any hearing loss.  The vertigo is intermittent and can happen sporadically once every few months.  Generally there is no clear pattern of what she is doing that will cause the vertigo.  Can be sitting standing.  There is no clear head movement.  More often when she is tired.  There is no association with diet.  Symptoms will only last for 30 seconds to 1 minute.  Does feel a bit nauseous with the vertigo though no other symptoms.  Has not tried any medications.  Has done physical therapy without any improvement. Denies any lightheadedness or chest pains.    She did have a history of migraine headaches.  These were in 2018.  She then improved her diet and these headaches have become very sporadic.  These are not associated with the dizzy spells.  An MRI was done in 2018 before the vertigo came on which was reportedly normal.  There is no dizziness associated with the headaches.    Previous history is reviewed and this is unchanged.    PAST MEDICAL/SURGICAL HISTORY  Past Medical History:   Diagnosis Date     Depressive disorder      There is no problem list on file for this patient.  Positive for migraines.    FAMILY  HISTORY  Family History   Problem Relation Age of Onset     Hyperlipidemia Mother      Breast Cancer Mother      Hypertension Father    Positive for migraines in her mother and father.  Otherwise negative for neurological problems.    SOCIAL HISTORY  Social History     Tobacco Use     Smoking status: Never     Smokeless tobacco: Never   Vaping Use     Vaping Use: Never used       SYSTEMS REVIEW  Twelve-system ROS was done and other than the HPI this was negative.  Pertinent positives noted in the HPI.    MEDICATIONS  cetirizine (ZYRTEC) 10 MG tablet, Take 10 mg by mouth At Bedtime  escitalopram (LEXAPRO) 5 MG tablet, Take 5 mg daily.  If depression and/or anxiety are worse in the winter she may take 10 mg daily.  montelukast (SINGULAIR) 10 MG tablet, Take 1 tablet (10 mg) by mouth At Bedtime  spironolactone (ALDACTONE) 25 MG tablet, Take 3 tablets (75 mg) by mouth daily    No current facility-administered medications on file prior to visit.       PHYSICAL EXAMINATION  VITALS: /76   Pulse 76   Resp 16   Wt 62.1 kg (137 lb)   LMP 01/14/2023   BMI 22.69 kg/m    GENERAL: Healthy appearing, alert, no acute distress, normal habitus.  CARDIOVASCULAR: Extremities warm and well perfused. Pulses present.   NEUROLOGICAL:  Patient is awake and oriented to self, place and time.  Attention span is normal.  Memory is grossly intact.  Language is fluent and follows commands appropriately.  Appropriate fund of knowledge. Cranial nerves 2-12 are intact. There is no pronator drift.  Motor exam shows 5/5 strength in all extremities.  Tone is symmetric bilaterally in upper and lower extremities.  Reflexes are symmetric and 2+ in upper extremities and lower extremities. Sensory exam is grossly intact to light touch, pin prick and vibration.  Finger to nose and heel to shin is without dysmetria.  Romberg is negative.  Gait is normal and the patient is able to do tandem walk and walk on toes and heels.      DIAGNOSTICS  RELEVANT  LABS  Component      Latest Ref Rng & Units 10/3/2022   Sodium      133 - 144 mmol/L 139   Potassium      3.4 - 5.3 mmol/L 3.7   Chloride      94 - 109 mmol/L 105   Carbon Dioxide      20 - 32 mmol/L 25   Anion Gap      3 - 14 mmol/L 9   Urea Nitrogen      7 - 30 mg/dL 12   Creatinine      0.52 - 1.04 mg/dL 0.67   Calcium      8.5 - 10.1 mg/dL 8.9   Glucose      70 - 99 mg/dL 77   Alkaline Phosphatase      40 - 150 U/L 57   AST      0 - 45 U/L 18   ALT      0 - 50 U/L 18   Protein Total      6.8 - 8.8 g/dL 7.4   Albumin      3.4 - 5.0 g/dL 4.1   Bilirubin Total      0.2 - 1.3 mg/dL 0.6   GFR Estimate      >60 mL/min/1.73m2 >90   WBC      4.0 - 11.0 10e3/uL 4.7   RBC Count      3.80 - 5.20 10e6/uL 4.78   Hemoglobin      11.7 - 15.7 g/dL 14.9   Hematocrit      35.0 - 47.0 % 42.0   MCV      78 - 100 fL 88   MCH      26.5 - 33.0 pg 31.2   MCHC      31.5 - 36.5 g/dL 35.5   RDW      10.0 - 15.0 % 12.1   Platelet Count      150 - 450 10e3/uL 224       OUTSIDE RECORDS  Outside referral notes and chart notes were reviewed and pertinent information has been summarized (in addition to the HPI):-    PT notes      IMPRESSION/REPORT/PLAN  Vertigo  History of migraines  History of ear infection    This is a 22 year old female with episodic vertigo lasting for less than a minute that is sporadic.  There is no clear provoking factors that she is able to identify.  She is done extensive physical therapy and they have been unable to provoke any of her spells.  Exam today is noncontributory.  She did have an MRI of the brain in 2018 before the symptoms came and this was negative.  Given that the vestibular rehab is unable to help repeat MRI would be prudent to rule out any new central causes.  We will also proceed with VNG testing to clarify if this is more central versus peripheral vertigo.  Since symptoms came on right after her ear infection most likely this is peripheral.  Could consider habituation therapy as recommended by  physical therapy if testing is not helpful.    She does have a history of migraines but these have now been under remission with improving her diet.  I do not suspect she has vestibular migraines.    I can see her back in about 6 weeks.  We will try to get the outside MRI    -     MR Brain w/o Contrast; Future  -     Adult Audiology  Referral; Future-VNG testing  - Get outside MRI.    Return in about 6 weeks (around 4/21/2023) for In-Clinic Visit (must), After testing.    Over 60 minutes were spent coordinating the care for the patient on the day of the encounter.  This includes previsit, during visit and post visit activities as documented above.  Counseling patient.  Reviewing outside notes/chart.  Trying to get outside MRI.  Testing ordered.  Reviewing previous blood tests.  (Activities include but not inclusive of reviewing chart, reviewing outside records, reviewing labs and imaging study results as well as the images, patient visit time including getting history and exam,  use if applicable, review of test results with the patient and coming up with a plan in a shared model, counseling patient and family, education and answering patient questions, EMR , EMR diagnosis entry and problem list management, medication reconciliation and prescription management if applicable, paperwork if applicable, printing documents and documentation of the visit activities.)        Gerber Baca MD  Neurologist  [facility]  Tel:- [phone]    This note was dictated using voice recognition software.  Any grammatical or context distortions are unintentional and inherent to the software.        Again, thank you for allowing me to participate in the care of your patient.        Sincerely,        Gerber Baca MD

## 2023-03-10 NOTE — PROGRESS NOTES
NEUROLOGY OUTPATIENT CONSULT NOTE  Mar 10, 2023     CHIEF COMPLAINT/REASON FOR VISIT/REASON FOR CONSULT  Patient presents with:  Vertigo    REASON FOR CONSULTATION- Vertigo    REFERRAL SOURCE  Dr. Jayashree Clemente  CC Dr. Jayashree Clemente    HISTORY OF PRESENT ILLNESS  Ivet Ortiz is a 22 year old female seen today for evaluation of vertigo.  She has been having vertigo since 2018.  At that time she had a ear infection.  Does not remember which side of the ear.  She was treated through urgent care with antibiotics.  Does not have any hearing loss.  The vertigo is intermittent and can happen sporadically once every few months.  Generally there is no clear pattern of what she is doing that will cause the vertigo.  Can be sitting standing.  There is no clear head movement.  More often when she is tired.  There is no association with diet.  Symptoms will only last for 30 seconds to 1 minute.  Does feel a bit nauseous with the vertigo though no other symptoms.  Has not tried any medications.  Has done physical therapy without any improvement. Denies any lightheadedness or chest pains.    She did have a history of migraine headaches.  These were in 2018.  She then improved her diet and these headaches have become very sporadic.  These are not associated with the dizzy spells.  An MRI was done in 2018 before the vertigo came on which was reportedly normal.  There is no dizziness associated with the headaches.    Previous history is reviewed and this is unchanged.    PAST MEDICAL/SURGICAL HISTORY  Past Medical History:   Diagnosis Date     Depressive disorder      There is no problem list on file for this patient.  Positive for migraines.    FAMILY HISTORY  Family History   Problem Relation Age of Onset     Hyperlipidemia Mother      Breast Cancer Mother      Hypertension Father    Positive for migraines in her mother and father.  Otherwise negative for neurological problems.    SOCIAL HISTORY  Social History     Tobacco  Use     Smoking status: Never     Smokeless tobacco: Never   Vaping Use     Vaping Use: Never used       SYSTEMS REVIEW  Twelve-system ROS was done and other than the HPI this was negative.  Pertinent positives noted in the HPI.    MEDICATIONS  cetirizine (ZYRTEC) 10 MG tablet, Take 10 mg by mouth At Bedtime  escitalopram (LEXAPRO) 5 MG tablet, Take 5 mg daily.  If depression and/or anxiety are worse in the winter she may take 10 mg daily.  montelukast (SINGULAIR) 10 MG tablet, Take 1 tablet (10 mg) by mouth At Bedtime  spironolactone (ALDACTONE) 25 MG tablet, Take 3 tablets (75 mg) by mouth daily    No current facility-administered medications on file prior to visit.       PHYSICAL EXAMINATION  VITALS: /76   Pulse 76   Resp 16   Wt 62.1 kg (137 lb)   LMP 01/14/2023   BMI 22.69 kg/m    GENERAL: Healthy appearing, alert, no acute distress, normal habitus.  CARDIOVASCULAR: Extremities warm and well perfused. Pulses present.   NEUROLOGICAL:  Patient is awake and oriented to self, place and time.  Attention span is normal.  Memory is grossly intact.  Language is fluent and follows commands appropriately.  Appropriate fund of knowledge. Cranial nerves 2-12 are intact. There is no pronator drift.  Motor exam shows 5/5 strength in all extremities.  Tone is symmetric bilaterally in upper and lower extremities.  Reflexes are symmetric and 2+ in upper extremities and lower extremities. Sensory exam is grossly intact to light touch, pin prick and vibration.  Finger to nose and heel to shin is without dysmetria.  Romberg is negative.  Gait is normal and the patient is able to do tandem walk and walk on toes and heels.      DIAGNOSTICS  RELEVANT LABS  Component      Latest Ref Rng & Units 10/3/2022   Sodium      133 - 144 mmol/L 139   Potassium      3.4 - 5.3 mmol/L 3.7   Chloride      94 - 109 mmol/L 105   Carbon Dioxide      20 - 32 mmol/L 25   Anion Gap      3 - 14 mmol/L 9   Urea Nitrogen      7 - 30 mg/dL 12    Creatinine      0.52 - 1.04 mg/dL 0.67   Calcium      8.5 - 10.1 mg/dL 8.9   Glucose      70 - 99 mg/dL 77   Alkaline Phosphatase      40 - 150 U/L 57   AST      0 - 45 U/L 18   ALT      0 - 50 U/L 18   Protein Total      6.8 - 8.8 g/dL 7.4   Albumin      3.4 - 5.0 g/dL 4.1   Bilirubin Total      0.2 - 1.3 mg/dL 0.6   GFR Estimate      >60 mL/min/1.73m2 >90   WBC      4.0 - 11.0 10e3/uL 4.7   RBC Count      3.80 - 5.20 10e6/uL 4.78   Hemoglobin      11.7 - 15.7 g/dL 14.9   Hematocrit      35.0 - 47.0 % 42.0   MCV      78 - 100 fL 88   MCH      26.5 - 33.0 pg 31.2   MCHC      31.5 - 36.5 g/dL 35.5   RDW      10.0 - 15.0 % 12.1   Platelet Count      150 - 450 10e3/uL 224       OUTSIDE RECORDS  Outside referral notes and chart notes were reviewed and pertinent information has been summarized (in addition to the HPI):-    PT notes      IMPRESSION/REPORT/PLAN  Vertigo  History of migraines  History of ear infection    This is a 22 year old female with episodic vertigo lasting for less than a minute that is sporadic.  There is no clear provoking factors that she is able to identify.  She is done extensive physical therapy and they have been unable to provoke any of her spells.  Exam today is noncontributory.  She did have an MRI of the brain in 2018 before the symptoms came and this was negative.  Given that the vestibular rehab is unable to help repeat MRI would be prudent to rule out any new central causes.  We will also proceed with VNG testing to clarify if this is more central versus peripheral vertigo.  Since symptoms came on right after her ear infection most likely this is peripheral.  Could consider habituation therapy as recommended by physical therapy if testing is not helpful.    She does have a history of migraines but these have now been under remission with improving her diet.  I do not suspect she has vestibular migraines.    I can see her back in about 6 weeks.  We will try to get the outside MRI    -      MR Brain w/o Contrast; Future  -     Adult Audiology  Referral; Future-VNG testing  - Get outside MRI.    Return in about 6 weeks (around 4/21/2023) for In-Clinic Visit (must), After testing.    Over 60 minutes were spent coordinating the care for the patient on the day of the encounter.  This includes previsit, during visit and post visit activities as documented above.  Counseling patient.  Reviewing outside notes/chart.  Trying to get outside MRI.  Testing ordered.  Reviewing previous blood tests.  (Activities include but not inclusive of reviewing chart, reviewing outside records, reviewing labs and imaging study results as well as the images, patient visit time including getting history and exam,  use if applicable, review of test results with the patient and coming up with a plan in a shared model, counseling patient and family, education and answering patient questions, EMR , EMR diagnosis entry and problem list management, medication reconciliation and prescription management if applicable, paperwork if applicable, printing documents and documentation of the visit activities.)        Gerber Baca MD  Neurologist  [facility]rology [facility]  Tel:- [phone]    This note was dictated using voice recognition software.  Any grammatical or context distortions are unintentional and inherent to the software.

## 2023-03-13 ENCOUNTER — TELEPHONE (OUTPATIENT)
Dept: OTOLARYNGOLOGY | Facility: CLINIC | Age: 23
End: 2023-03-13
Payer: COMMERCIAL

## 2023-03-13 NOTE — TELEPHONE ENCOUNTER
[facility] Call Center    Phone Message    May a detailed message be left on voicemail: no     Reason for Call: Appointment Intake    Referring Provider Name: Gerber Baca MD  Diagnosis and/or Symptoms: Balance Referral: Videonystagmography (VNG) with [employer]    Sending to clinic for review per protocols.    Action Taken: Other: Audiology    Travel Screening: Not Applicable

## 2023-03-24 ENCOUNTER — HOSPITAL ENCOUNTER (OUTPATIENT)
Dept: MRI IMAGING | Facility: CLINIC | Age: 23
Discharge: HOME | End: 2023-03-24
Attending: PSYCHIATRY & NEUROLOGY | Admitting: PSYCHIATRY & NEUROLOGY
Payer: OTHER GOVERNMENT

## 2023-03-24 DIAGNOSIS — R42 VERTIGO: ICD-10-CM

## 2023-03-24 PROCEDURE — 70551 MRI BRAIN STEM W/O DYE: CPT

## 2023-03-27 ENCOUNTER — TELEPHONE (OUTPATIENT)
Dept: OTOLARYNGOLOGY | Facility: CLINIC | Age: 23
End: 2023-03-27
Payer: COMMERCIAL

## 2023-03-27 NOTE — TELEPHONE ENCOUNTER
[facility] Call Center    Phone Message    May a detailed message be left on voicemail: yes     Reason for Call: Other: Pt is calling to schedule New Vestibular appointment at [place] location, please call # provided, Pt is available now if able to call her soon, thanks     Action Taken: Other: ENT    Travel Screening: Not Applicable

## 2023-04-29 ENCOUNTER — E-VISIT (OUTPATIENT)
Dept: FAMILY MEDICINE | Facility: CLINIC | Age: 23
End: 2023-04-29
Payer: COMMERCIAL

## 2023-04-29 DIAGNOSIS — L70.0 ACNE VULGARIS: Primary | ICD-10-CM

## 2023-04-29 PROCEDURE — 99423 OL DIG E/M SVC 21+ MIN: CPT | Performed by: PHYSICIAN ASSISTANT

## 2023-04-29 RX ORDER — TRETINOIN 0.5 MG/G
CREAM TOPICAL AT BEDTIME
Qty: 45 G | Refills: 0 | Status: SHIPPED | OUTPATIENT
Start: 2023-04-29 | End: 2023-06-08

## 2023-04-30 NOTE — TELEPHONE ENCOUNTER
Provider E-Visit time total (minutes):  22 minutes      Sent in [employer] message as below -       Jeferson Cooney ,     I am covering for Andrea REY who is out of office currently.  I understand your concern. I have sent in medication Tretinoin as requested to your pharmacy. I do recommend dermatology to be on board for managing your acne which have been resistant as per the history given for complete care of it. Placed the referral as well.     Please let me know if you have any questions.     Thank you,  Anca Ivan MD on 4/29/2023   [facility]

## 2023-05-01 ENCOUNTER — OFFICE VISIT (OUTPATIENT)
Dept: AUDIOLOGY | Facility: CLINIC | Age: 23
End: 2023-05-01
Payer: COMMERCIAL

## 2023-05-01 DIAGNOSIS — R42 VERTIGO: ICD-10-CM

## 2023-05-01 PROCEDURE — 92537 CALORIC VSTBLR TEST W/REC: CPT | Performed by: AUDIOLOGIST

## 2023-05-01 PROCEDURE — 92541 SPONTANEOUS NYSTAGMUS TEST: CPT | Performed by: AUDIOLOGIST

## 2023-05-01 PROCEDURE — 92545 OSCILLATING TRACKING TEST: CPT | Mod: 59 | Performed by: AUDIOLOGIST

## 2023-05-01 PROCEDURE — 92542 POSITIONAL NYSTAGMUS TEST: CPT | Mod: 59 | Performed by: AUDIOLOGIST

## 2023-05-01 PROCEDURE — 92567 TYMPANOMETRY: CPT | Performed by: AUDIOLOGIST

## 2023-05-01 NOTE — Clinical Note
Thanks for the referral. No peripheral findings on VNG testing. Some mild/possible central indications. Please see my note for details. Do you think her episodes could be vestibular migraine related?  Thanks, Keely

## 2023-05-01 NOTE — PROGRESS NOTES
"AUDIOLOGY REPORT-BALANCE ASSESSMENT    SUBJECTIVE: Ivet Ortiz, 22 year old, was seen in Audiology at the [facility] on 5/1/2023, for videonystagmography (VNG), referred by Gerber Baca M.D. The patient reports ongoing issues with episodic vertigo which started after having an ear infection and being treated with antibiotics in 2018.  She describes the episodes as \"world spinning\" which lasts from seconds to 2 minutes.  She reports it feels like she is about to fall and she also feels like her eyes are moving during episodes.  She reports occasionally prolonged symptoms after the episodes with quick head movement.  She reports the episodes are random and sometimes she has none for a month, and then they come in clusters and she may have 2 or 3 episodes the next month.  She is not aware of any consistent triggers but notes that sometimes episodes occur when she is looking at a phone or computer screen.  She reports that initially she felt symptoms when she was shopping in grocery stores with long aisles, but since starting her medication for anxiety this has not occurred.  She reports that when she started taking spironolactone for her allergies she felt that episodes were happening more frequently, but now they are back to where they were prior to starting the medication.  She reports that when episodes occur sitting and closing her eyes usually helps her feel less nauseous.  She feels her balance is good and denies any falls due to her dizziness.  She reports 1 fall this past winter where she tripped on ice while walking her dog, with no injuries sustained.  She reports history of anxiety and depression, but feels this is well controlled with her medication.    She reports a history of migraines all throughout high school and college, which subsided after college.  If she does get a headache she takes Tylenol or ibuprofen which helps.  She reports sensitivity to bright lights " with her headaches.  Denies current light sensitivity or hyperacusis.  Denies dizziness triggered by loud sounds, pressure changes, or exertion.  Denies motion intolerance, history of head injuries, blurred or double vision, or history of eye surgeries.  She reports her vision has gotten slightly worse over time and she wears contact lenses.  She reports family history of migraines, denies family history of vertigo.    No recent hearing evaluations have been completed, however the patient denies concerns with hearing changes or fluctuations.  She reports occasional itchiness in her ears if she does not take her allergy medication.  She denies to this, ear pain, fullness, drainage, or history of ear surgeries.  She reports family history of hearing loss in her father as a result of  service.  Ivet has not taken any antivestibular medications in the past 48 hours.      OBJECTIVE:  Abuse Screening:  Do you feel unsafe at home or work/school? No  Do you feel threatened by someone? No  Does anyone try to keep you from having contact with others, or doing things outside of your home? No  Physical signs of abuse present? No    Dizziness Handicap Inventory (DHI): 8/100 : No perceived impairment      Videonystagmography (VNG) testing:  Prescreening:  Tympanograms: normal eardrum mobility bilaterally. Note: this test is completed to determine the status of the middle ear before irrigations are completed.  Ocular range of motion and ocular counter roll: Normal  Cross/cover:Normal  Head Thrust: Negative     Nystagmus Tests:  Gaze-Horizontal with fixation:   Center: Normal   Right: Normal   Left: Normal  Gaze-Vertical with fixation:   Up: Normal   Down: Normal  Gaze with fixation denied:   Center: Normal   Right: Normal   Left: Normal   Up: Normal  High Frequency Headshake:   Horizontal: Positive for 3 degrees/s left beating nystagmus.  Patient reported feeling slightly disoriented   Vertical: Negative; no nystagmus.   Patient reported feeling slightly disoriented    [place] Head Right: Negative for PC-BPPV. No nystagmus or symptoms   [place] Head Left: negative for PC-BPPV. No nystagmus or symptoms   Roll Test Head Right: negative for HC-BPPV. No nystagmus or symptoms   Roll Test Head Left: negative for HC-BPPV. No nystagmus or symptoms     Positional Testing:  Positionals: Supine: 2 degrees/s left beating nystagmus  Positionals: Body Right: Intermittent mild left beating nystagmus (2 degrees/s)  Positionals: Body Left: Normal  Positionals: Pre-Caloric: 3 degrees/s left beating nystagmus    Oculomotor Tests:  Saccades: Normal  Anti-saccades: Normal; Patient able to perform task  Pursuit: Abnormal with many saccadic intrusions.  No improvement on repeat.    Calorics :  (Tested at 44 degrees and 30 degrees Celsius for 30 seconds for warm and cool water, respectively):  Right Warm Eye Speed: 46 degrees per second right beating  Left Warm Eye Speed: 66 degrees per second left beating  Right Cool Eye Speed: 32 degrees per second left beating  Left Cool Eye Speed: 34 degrees per second right beating  Difference between ear: 12% right hypofunction. (Greater than 25% considered clinically significant.)  Fixation Index: Normal   Overall caloric test: Normal    Post-Calorics Otoscopy: Normal    ASSESSMENT:   1.  Mild/possible indications of central vestibular system involvement noted on today's exam were as follows:   - Abnormal Pursuit testing; repeatable  - Mild left beating nystagmus present during 3/4 Positionals and post-Horizontal Headshake (in absence of significant hypofunction, likely central)    2. There were no significant indications of peripheral vestibular system involvement noted on today's exam.       PLAN:  Follow-up with Gerber Baca M.D. regarding today's results and for medical management.  Please call this clinic at [phone] with questions regarding these results or recommendations.       Keely Golden,  [place]  Licensed Audiologist  [place] # [id number]

## 2023-05-19 DIAGNOSIS — L70.0 ACNE VULGARIS: ICD-10-CM

## 2023-05-19 RX ORDER — SPIRONOLACTONE 50 MG/1
TABLET, FILM COATED ORAL
Qty: 90 TABLET | Refills: 1 | OUTPATIENT
Start: 2023-05-19

## 2023-06-07 ASSESSMENT — PATIENT HEALTH QUESTIONNAIRE - PHQ9
SUM OF ALL RESPONSES TO PHQ QUESTIONS 1-9: 2
10. IF YOU CHECKED OFF ANY PROBLEMS, HOW DIFFICULT HAVE THESE PROBLEMS MADE IT FOR YOU TO DO YOUR WORK, TAKE CARE OF THINGS AT HOME, OR GET ALONG WITH OTHER PEOPLE: NOT DIFFICULT AT ALL
SUM OF ALL RESPONSES TO PHQ QUESTIONS 1-9: 2

## 2023-06-08 ENCOUNTER — VIRTUAL VISIT (OUTPATIENT)
Dept: FAMILY MEDICINE | Facility: CLINIC | Age: 23
End: 2023-06-08
Payer: COMMERCIAL

## 2023-06-08 DIAGNOSIS — L70.0 ACNE VULGARIS: ICD-10-CM

## 2023-06-08 DIAGNOSIS — J30.89 ALLERGIC RHINITIS DUE TO DUST MITE: Primary | ICD-10-CM

## 2023-06-08 PROCEDURE — 99213 OFFICE O/P EST LOW 20 MIN: CPT | Mod: VID | Performed by: PHYSICIAN ASSISTANT

## 2023-06-08 RX ORDER — TRETINOIN 1 MG/G
CREAM TOPICAL AT BEDTIME
COMMUNITY
Start: 2023-06-08 | End: 2023-08-22

## 2023-06-08 RX ORDER — TRETINOIN 1 MG/G
CREAM TOPICAL AT BEDTIME
COMMUNITY
End: 2023-06-08

## 2023-06-08 ASSESSMENT — PATIENT HEALTH QUESTIONNAIRE - PHQ9
SUM OF ALL RESPONSES TO PHQ QUESTIONS 1-9: 2
10. IF YOU CHECKED OFF ANY PROBLEMS, HOW DIFFICULT HAVE THESE PROBLEMS MADE IT FOR YOU TO DO YOUR WORK, TAKE CARE OF THINGS AT HOME, OR GET ALONG WITH OTHER PEOPLE: NOT DIFFICULT AT ALL

## 2023-06-08 NOTE — PROGRESS NOTES
Ivet is a 22 year old who is being evaluated via a billable video visit.      How would you like to obtain your AVS? [employer]  If the video visit is dropped, the invitation should be resent by: Text to cell phone: [phone]  Will anyone else be joining your video visit? No          Assessment & Plan   Problem List Items Addressed This Visit    None  Visit Diagnoses     Allergic rhinitis due to dust mite    -  Primary         PLAN -   Increase Zyrtec to 20 mg daily, continue Montelukast  If symptoms are present, follow up with Allergy - reach out via [employer] message                 Gay Lopez PA-C  [facility]EDDIE Cooney is a 22 year old, presenting for the following health issues:  Allergies      History of Present Illness       Reason for visit:  Allergies    She eats 4 or more servings of fruits and vegetables daily.She consumes 0 sweetened beverage(s) daily.She exercises with enough effort to increase her heart rate 30 to 60 minutes per day.  She exercises with enough effort to increase her heart rate 4 days per week.   She is taking medications regularly.    Today's PHQ-9         PHQ-9 Total Score: 2    PHQ-9 Q9 Thoughts of better off dead/self-harm past 2 weeks :   Not at all    How difficult have these problems made it for you to do your work, take care of things at home, or get along with other people: Not difficult at all     Patient attempted to make an appointment with allergy clinic - at this time she is not able to schedule wondering if a new referral is needed.     Allergies - dust mites and grass     She gets are itchy throat and mouth, sneezes a lot  Allergist gave her montelukast  Currently she is taking Zyrtec and Montelukast (alternates Zyrtec, Xyzal, Claritin)  Symptoms persist  Stopped using nasal sprays due to nose bleeds              Review of Systems         Objective           Vitals:  No vitals were obtained today due to virtual  visit.    Physical Exam   GENERAL: Healthy, alert and no distress  EYES: Eyes grossly normal to inspection.  No discharge or erythema, or obvious scleral/conjunctival abnormalities.  RESP: No audible wheeze, cough, or visible cyanosis.  No visible retractions or increased work of breathing.    SKIN: Visible skin clear. No significant rash, abnormal pigmentation or lesions.  NEURO: Cranial nerves grossly intact.  Mentation and speech appropriate for age.  PSYCH: Mentation appears normal, affect normal/bright, judgement and insight intact, normal speech and appearance well-groomed.                Video-Visit Details    Type of service:  Video Visit     Originating Location (pt. Location): Home    Distant Location (provider location):  Off-site  Platform used for Video Visit: [employer]

## 2023-07-19 ENCOUNTER — VIRTUAL VISIT (OUTPATIENT)
Dept: NEUROLOGY | Facility: CLINIC | Age: 23
End: 2023-07-19
Payer: COMMERCIAL

## 2023-07-19 DIAGNOSIS — R42 DIZZINESS: Primary | ICD-10-CM

## 2023-07-19 DIAGNOSIS — G43.809 OTHER MIGRAINE WITHOUT STATUS MIGRAINOSUS, NOT INTRACTABLE: ICD-10-CM

## 2023-07-19 DIAGNOSIS — R42 VERTIGO: ICD-10-CM

## 2023-07-19 PROCEDURE — 99214 OFFICE O/P EST MOD 30 MIN: CPT | Mod: VID | Performed by: PSYCHIATRY & NEUROLOGY

## 2023-07-19 NOTE — LETTER
"    7/19/2023         RE: Ivet Ortiz  [address]  [place] [address]        Dear Colleague,    Thank you for referring your patient, Ivet Ortiz, to the [facility] [place]. Please see a copy of my visit note below.    NEUROLOGY OUTPATIENT PROGRESS NOTE (VIDEO)  Jul 19, 2023     CHIEF COMPLAINT/REASON FOR VISIT/REASON FOR CONSULT  Patient presents with:  Follow Up    REASON FOR CONSULTATION- Vertigo    REFERRAL SOURCE  Dr. Jayashree Clemente  CC Dr. Jayashree Clemente    Video Visit Consent  Patient is being evaluated via a billable video visit. The patient has been notified of following:   \"This video visit will be conducted via a call between you and your physician/provider. We have found that certain health care needs can be provided without the need for an in-person physical exam. This service lets us provide the care you need with a video conversation. If a prescription is necessary we can send it directly to your pharmacy. If lab work is needed we can place an order for that and you can then stop by our lab to have the test done at a later time.  If during the course of the call the physician/provider feels a video visit is not appropriate, you will not be charged for this service.  Physician has received verbal consent for a Video Visit from the patient? YES  Patient would like the video invitation sent by: Email/SMS    Video Visit Details  Type of service: Video Visit  Video Start Time: 8:05  Video End Time (time video stopped): 8:15  Originating Location (pt. Location): Patient's Home  Distant Location (provider location): [facility] [place]   Mode of Communication: Video Conference via [employer]        HISTORY OF PRESENT ILLNESS  Ivet Ortiz is a 22 year old female seen today for evaluation of vertigo.  She has been having vertigo since 2018.  At that time she had a ear infection.  Does not remember which side of the ear.  She was treated " through urgent care with antibiotics.  Does not have any hearing loss.  The vertigo is intermittent and can happen sporadically once every few months.  Generally there is no clear pattern of what she is doing that will cause the vertigo.  Can be sitting standing.  There is no clear head movement.  More often when she is tired.  There is no association with diet.  Symptoms will only last for 30 seconds to 1 minute.  Does feel a bit nauseous with the vertigo though no other symptoms.  Has not tried any medications.  Has done physical therapy without any improvement. Denies any lightheadedness or chest pains.    She did have a history of migraine headaches.  These were in 2018.  She then improved her diet and these headaches have become very sporadic.  These are not associated with the dizzy spells.  An MRI was done in 2018 before the vertigo came on which was reportedly normal.  There is no dizziness associated with the headaches.    7/19/23  Patient reports that the vertigo is significantly improved.  Mainly has spells lasting 30 seconds and it comes and goes.  This is very rare.  Headaches have significantly come down.  Was having more headaches for years ago but no headaches at this point.  When the spells of vertigo flareup there are no headaches.  Has seen audiology and they think there is a central cause to the vertigo.  MRI did not show any structural lesions.    Previous history is reviewed and this is unchanged.    PAST MEDICAL/SURGICAL HISTORY  Past Medical History:   Diagnosis Date     Depressive disorder      There is no problem list on file for this patient.  Positive for migraines.    FAMILY HISTORY  Family History   Problem Relation Age of Onset     Hyperlipidemia Mother      Breast Cancer Mother      Hypertension Father    Positive for migraines in her mother and father.  Otherwise negative for neurological problems.    SOCIAL HISTORY  Social History     Tobacco Use     Smoking status: Never      Smokeless tobacco: Never   Vaping Use     Vaping Use: Never used       SYSTEMS REVIEW  Twelve-system ROS was done and other than the HPI this was negative.  Pertinent positives noted in the HPI.    MEDICATIONS  escitalopram (LEXAPRO) 5 MG tablet, Take 5 mg daily.  If depression and/or anxiety are worse in the winter she may take 10 mg daily.  montelukast (SINGULAIR) 10 MG tablet, Take 1 tablet (10 mg) by mouth At Bedtime  spironolactone (ALDACTONE) 25 MG tablet, Take 3 tablets (75 mg) by mouth daily  tretinoin (RETIN-A) 0.1 % external cream, Apply topically At Bedtime  cetirizine (ZYRTEC) 10 MG tablet, Take 10 mg by mouth At Bedtime    No current facility-administered medications on file prior to visit.     PHYSICAL EXAM  Exam was limited due to video encounter.    Vitals-Unable to do on video  GENERAL -Health appearing, No apparent distress  EYES- No scleral icterus, no eyelid droop, Pupils symmetric  HEENT - Normocephalic, atraumatic, Hearing grossly intact; Oral mucosa moist and pink in color. External Ears and nose intact.   Neck - soft/flexible with normal ROM on visual inspection.  PULM - Good spontaneous respiratory effort;  CV- No edema on visual inspection  MSK- Gait - see Neuro section; Strength and tone- see Neuro section; Range of motion grossly intact.  Psych- Normal mood and affect. Good judgment and insight.     Neurological  Mental status - Patient is awake and oriented. Attention span is normal. Language is fluent and follows commands appropriately.   Cranial nerves - Pupils are symmetric; EOMI, NLF symmetric  Motor - There is no pronator drift. Antigravity in all 4 ext.  Tone - No evidence of rigidity on visual inspection. No tremor.  Reflexes - Unable to do on video  Sensation - Unable to do on Video  Coordination - Finger to nose without dysmetria.   Gait and station - Romberg is negative. Gait is steady    Previous PHYSICAL EXAMINATION  VITALS: There were no vitals taken for this  visit.  GENERAL: Healthy appearing, alert, no acute distress, normal habitus.  CARDIOVASCULAR: Extremities warm and well perfused. Pulses present.   NEUROLOGICAL:  Patient is awake and oriented to self, place and time.  Attention span is normal.  Memory is grossly intact.  Language is fluent and follows commands appropriately.  Appropriate fund of knowledge. Cranial nerves 2-12 are intact. There is no pronator drift.  Motor exam shows 5/5 strength in all extremities.  Tone is symmetric bilaterally in upper and lower extremities.  Reflexes are symmetric and 2+ in upper extremities and lower extremities. Sensory exam is grossly intact to light touch, pin prick and vibration.  Finger to nose and heel to shin is without dysmetria.  Romberg is negative.  Gait is normal and the patient is able to do tandem walk and walk on toes and heels.      DIAGNOSTICS  RELEVANT LABS  Component      Latest Ref Rng & Units 10/3/2022   Sodium      133 - 144 mmol/L 139   Potassium      3.4 - 5.3 mmol/L 3.7   Chloride      94 - 109 mmol/L 105   Carbon Dioxide      20 - 32 mmol/L 25   Anion Gap      3 - 14 mmol/L 9   Urea Nitrogen      7 - 30 mg/dL 12   Creatinine      0.52 - 1.04 mg/dL 0.67   Calcium      8.5 - 10.1 mg/dL 8.9   Glucose      70 - 99 mg/dL 77   Alkaline Phosphatase      40 - 150 U/L 57   AST      0 - 45 U/L 18   ALT      0 - 50 U/L 18   Protein Total      6.8 - 8.8 g/dL 7.4   Albumin      3.4 - 5.0 g/dL 4.1   Bilirubin Total      0.2 - 1.3 mg/dL 0.6   GFR Estimate      >60 mL/min/1.73m2 >90   WBC      4.0 - 11.0 10e3/uL 4.7   RBC Count      3.80 - 5.20 10e6/uL 4.78   Hemoglobin      11.7 - 15.7 g/dL 14.9   Hematocrit      35.0 - 47.0 % 42.0   MCV      78 - 100 fL 88   MCH      26.5 - 33.0 pg 31.2   MCHC      31.5 - 36.5 g/dL 35.5   RDW      10.0 - 15.0 % 12.1   Platelet Count      150 - 450 10e3/uL 224       OUTSIDE RECORDS  Outside referral notes and chart notes were reviewed and pertinent information has been summarized  (in addition to the HPI):-    PT notes        MRI- images reviewed.  No structural lesions.  IMPRESSION:  Unremarkable unenhanced brain MRI. No acute intracranial process.        IMPRESSION/REPORT/PLAN  Vertigo  History of migraines  History of ear infection    This is a 22 year old female with episodic vertigo lasting for less than a minute that is sporadic.  There is no clear provoking factors that she is able to identify.  She is done extensive physical therapy and they have been unable to provoke any of her spells.  Exam today is noncontributory.  She did have an MRI of the brain in 2018 before the symptoms came and this was negative.  Repeat MRI has been negative for structural lesions. Since symptoms came on right after her ear infection most likely this is peripheral.  Clinically her symptoms sound more peripheral the VNG suggested more of a central cause.  With negative MRI and improvement in her symptoms we will hold off on further work-up though could consider referral to [facility]-dizziness specialist if there is recurrence of symptoms.    She could consider habituation therapy as recommended by physical therapy.    She does have a history of migraines but these have now been under remission with improving her diet.  I do not suspect she has vestibular migraines.    Return on as-needed basis.  Should keep a log of his symptoms to see if there is a pattern with headaches or other triggers for her dizziness.    Return if symptoms worsen or fail to improve, for In-Clinic Visit (must).    Over 30 minutes were spent coordinating the care for the patient on the day of the encounter.  This includes previsit, during visit and post visit activities as documented above.  Counseling patient.  Reviewing testing/chart.  (Activities include but not inclusive of reviewing chart, reviewing outside records, reviewing labs and imaging study results as well as the images, patient visit time including getting  history and exam,  use if applicable, review of test results with the patient and coming up with a plan in a shared model, counseling patient and family, education and answering patient questions, EMR , EMR diagnosis entry and problem list management, medication reconciliation and prescription management if applicable, paperwork if applicable, printing documents and documentation of the visit activities.)        Gerber Baca MD  Neurologist  [facility]  Tel:- [phone]    This note was dictated using voice recognition software.  Any grammatical or context distortions are unintentional and inherent to the software.        Again, thank you for allowing me to participate in the care of your patient.        Sincerely,        Gerber Baca MD

## 2023-07-19 NOTE — PROGRESS NOTES
"NEUROLOGY OUTPATIENT PROGRESS NOTE (VIDEO)  Jul 19, 2023     CHIEF COMPLAINT/REASON FOR VISIT/REASON FOR CONSULT  Patient presents with:  Follow Up    REASON FOR CONSULTATION- Vertigo    REFERRAL SOURCE  Dr. Jayashree Clemente  CC Dr. Jayashree Clemente    Video Visit Consent  Patient is being evaluated via a billable video visit. The patient has been notified of following:   \"This video visit will be conducted via a call between you and your physician/provider. We have found that certain health care needs can be provided without the need for an in-person physical exam. This service lets us provide the care you need with a video conversation. If a prescription is necessary we can send it directly to your pharmacy. If lab work is needed we can place an order for that and you can then stop by our lab to have the test done at a later time.  If during the course of the call the physician/provider feels a video visit is not appropriate, you will not be charged for this service.  Physician has received verbal consent for a Video Visit from the patient? YES  Patient would like the video invitation sent by: Email/SMS    Video Visit Details  Type of service: Video Visit  Video Start Time: 8:05  Video End Time (time video stopped): 8:15  Originating Location (pt. Location): Patient's Home  Distant Location (provider location): [facility] [place]   Mode of Communication: Video Conference via [employer]        HISTORY OF PRESENT ILLNESS  Ivet Ortiz is a 22 year old female seen today for evaluation of vertigo.  She has been having vertigo since 2018.  At that time she had a ear infection.  Does not remember which side of the ear.  She was treated through urgent care with antibiotics.  Does not have any hearing loss.  The vertigo is intermittent and can happen sporadically once every few months.  Generally there is no clear pattern of what she is doing that will cause the vertigo.  Can be sitting standing.  " There is no clear head movement.  More often when she is tired.  There is no association with diet.  Symptoms will only last for 30 seconds to 1 minute.  Does feel a bit nauseous with the vertigo though no other symptoms.  Has not tried any medications.  Has done physical therapy without any improvement. Denies any lightheadedness or chest pains.    She did have a history of migraine headaches.  These were in 2018.  She then improved her diet and these headaches have become very sporadic.  These are not associated with the dizzy spells.  An MRI was done in 2018 before the vertigo came on which was reportedly normal.  There is no dizziness associated with the headaches.    7/19/23  Patient reports that the vertigo is significantly improved.  Mainly has spells lasting 30 seconds and it comes and goes.  This is very rare.  Headaches have significantly come down.  Was having more headaches for years ago but no headaches at this point.  When the spells of vertigo flareup there are no headaches.  Has seen audiology and they think there is a central cause to the vertigo.  MRI did not show any structural lesions.    Previous history is reviewed and this is unchanged.    PAST MEDICAL/SURGICAL HISTORY  Past Medical History:   Diagnosis Date     Depressive disorder      There is no problem list on file for this patient.  Positive for migraines.    FAMILY HISTORY  Family History   Problem Relation Age of Onset     Hyperlipidemia Mother      Breast Cancer Mother      Hypertension Father    Positive for migraines in her mother and father.  Otherwise negative for neurological problems.    SOCIAL HISTORY  Social History     Tobacco Use     Smoking status: Never     Smokeless tobacco: Never   Vaping Use     Vaping Use: Never used       SYSTEMS REVIEW  Twelve-system ROS was done and other than the HPI this was negative.  Pertinent positives noted in the HPI.    MEDICATIONS  escitalopram (LEXAPRO) 5 MG tablet, Take 5 mg daily.  If  depression and/or anxiety are worse in the winter she may take 10 mg daily.  montelukast (SINGULAIR) 10 MG tablet, Take 1 tablet (10 mg) by mouth At Bedtime  spironolactone (ALDACTONE) 25 MG tablet, Take 3 tablets (75 mg) by mouth daily  tretinoin (RETIN-A) 0.1 % external cream, Apply topically At Bedtime  cetirizine (ZYRTEC) 10 MG tablet, Take 10 mg by mouth At Bedtime    No current facility-administered medications on file prior to visit.     PHYSICAL EXAM  Exam was limited due to video encounter.    Vitals-Unable to do on video  GENERAL -Health appearing, No apparent distress  EYES- No scleral icterus, no eyelid droop, Pupils symmetric  HEENT - Normocephalic, atraumatic, Hearing grossly intact; Oral mucosa moist and pink in color. External Ears and nose intact.   Neck - soft/flexible with normal ROM on visual inspection.  PULM - Good spontaneous respiratory effort;  CV- No edema on visual inspection  MSK- Gait - see Neuro section; Strength and tone- see Neuro section; Range of motion grossly intact.  Psych- Normal mood and affect. Good judgment and insight.     Neurological  Mental status - Patient is awake and oriented. Attention span is normal. Language is fluent and follows commands appropriately.   Cranial nerves - Pupils are symmetric; EOMI, NLF symmetric  Motor - There is no pronator drift. Antigravity in all 4 ext.  Tone - No evidence of rigidity on visual inspection. No tremor.  Reflexes - Unable to do on video  Sensation - Unable to do on Video  Coordination - Finger to nose without dysmetria.   Gait and station - Romberg is negative. Gait is steady    Previous PHYSICAL EXAMINATION  VITALS: There were no vitals taken for this visit.  GENERAL: Healthy appearing, alert, no acute distress, normal habitus.  CARDIOVASCULAR: Extremities warm and well perfused. Pulses present.   NEUROLOGICAL:  Patient is awake and oriented to self, place and time.  Attention span is normal.  Memory is grossly intact.  Language  is fluent and follows commands appropriately.  Appropriate fund of knowledge. Cranial nerves 2-12 are intact. There is no pronator drift.  Motor exam shows 5/5 strength in all extremities.  Tone is symmetric bilaterally in upper and lower extremities.  Reflexes are symmetric and 2+ in upper extremities and lower extremities. Sensory exam is grossly intact to light touch, pin prick and vibration.  Finger to nose and heel to shin is without dysmetria.  Romberg is negative.  Gait is normal and the patient is able to do tandem walk and walk on toes and heels.      DIAGNOSTICS  RELEVANT LABS  Component      Latest Ref Rng & Units 10/3/2022   Sodium      133 - 144 mmol/L 139   Potassium      3.4 - 5.3 mmol/L 3.7   Chloride      94 - 109 mmol/L 105   Carbon Dioxide      20 - 32 mmol/L 25   Anion Gap      3 - 14 mmol/L 9   Urea Nitrogen      7 - 30 mg/dL 12   Creatinine      0.52 - 1.04 mg/dL 0.67   Calcium      8.5 - 10.1 mg/dL 8.9   Glucose      70 - 99 mg/dL 77   Alkaline Phosphatase      40 - 150 U/L 57   AST      0 - 45 U/L 18   ALT      0 - 50 U/L 18   Protein Total      6.8 - 8.8 g/dL 7.4   Albumin      3.4 - 5.0 g/dL 4.1   Bilirubin Total      0.2 - 1.3 mg/dL 0.6   GFR Estimate      >60 mL/min/1.73m2 >90   WBC      4.0 - 11.0 10e3/uL 4.7   RBC Count      3.80 - 5.20 10e6/uL 4.78   Hemoglobin      11.7 - 15.7 g/dL 14.9   Hematocrit      35.0 - 47.0 % 42.0   MCV      78 - 100 fL 88   MCH      26.5 - 33.0 pg 31.2   MCHC      31.5 - 36.5 g/dL 35.5   RDW      10.0 - 15.0 % 12.1   Platelet Count      150 - 450 10e3/uL 224       OUTSIDE RECORDS  Outside referral notes and chart notes were reviewed and pertinent information has been summarized (in addition to the HPI):-    PT notes        MRI- images reviewed.  No structural lesions.  IMPRESSION:  Unremarkable unenhanced brain MRI. No acute intracranial process.        IMPRESSION/REPORT/PLAN  Vertigo  History of migraines  History of ear infection    This is a 22 year old  female with episodic vertigo lasting for less than a minute that is sporadic.  There is no clear provoking factors that she is able to identify.  She is done extensive physical therapy and they have been unable to provoke any of her spells.  Exam today is noncontributory.  She did have an MRI of the brain in 2018 before the symptoms came and this was negative.  Repeat MRI has been negative for structural lesions. Since symptoms came on right after her ear infection most likely this is peripheral.  Clinically her symptoms sound more peripheral the VNG suggested more of a central cause.  With negative MRI and improvement in her symptoms we will hold off on further work-up though could consider referral to [facility]-dizziness specialist if there is recurrence of symptoms.    She could consider habituation therapy as recommended by physical therapy.    She does have a history of migraines but these have now been under remission with improving her diet.  I do not suspect she has vestibular migraines.    Return on as-needed basis.  Should keep a log of his symptoms to see if there is a pattern with headaches or other triggers for her dizziness.    Return if symptoms worsen or fail to improve, for In-Clinic Visit (must).    Over 30 minutes were spent coordinating the care for the patient on the day of the encounter.  This includes previsit, during visit and post visit activities as documented above.  Counseling patient.  Reviewing testing/chart.  (Activities include but not inclusive of reviewing chart, reviewing outside records, reviewing labs and imaging study results as well as the images, patient visit time including getting history and exam,  use if applicable, review of test results with the patient and coming up with a plan in a shared model, counseling patient and family, education and answering patient questions, EMR , EMR diagnosis entry and problem list management, medication  reconciliation and prescription management if applicable, paperwork if applicable, printing documents and documentation of the visit activities.)        Gerber Baca MD  Neurologist  [facility]  Tel:- [phone]    This note was dictated using voice recognition software.  Any grammatical or context distortions are unintentional and inherent to the software.

## 2023-08-22 ENCOUNTER — OFFICE VISIT (OUTPATIENT)
Dept: FAMILY MEDICINE | Facility: CLINIC | Age: 23
End: 2023-08-22
Payer: COMMERCIAL

## 2023-08-22 DIAGNOSIS — Z12.83 SKIN CANCER SCREENING: ICD-10-CM

## 2023-08-22 DIAGNOSIS — L82.1 SEBORRHEIC KERATOSES: ICD-10-CM

## 2023-08-22 DIAGNOSIS — D22.9 MULTIPLE PIGMENTED NEVI: ICD-10-CM

## 2023-08-22 DIAGNOSIS — B07.0 PLANTAR WART OF RIGHT FOOT: Primary | ICD-10-CM

## 2023-08-22 DIAGNOSIS — L70.0 ACNE VULGARIS: ICD-10-CM

## 2023-08-22 PROCEDURE — 99214 OFFICE O/P EST MOD 30 MIN: CPT | Performed by: PHYSICIAN ASSISTANT

## 2023-08-22 RX ORDER — TRETINOIN 1 MG/G
CREAM TOPICAL AT BEDTIME
Qty: 45 G | Refills: 3 | Status: SHIPPED | OUTPATIENT
Start: 2023-08-22

## 2023-08-22 NOTE — PATIENT INSTRUCTIONS
Pediatric Dermatology   [place] [id number] [place] [id number]  [phone]    Over The Counter at Home Wart Instructions:    Please follow instructions closely and do not skip days of treatment.  Soak warts for 10 minutes in warm water (this can be while bathing or showering).   Pat area dry with a towel.   Gently remove any whitish dead skin from the surface of the warts. Stop if it becomes painful or starts to bleed.   Nail files or pumice stones can be used, but should not be reused on normal skin and should not be used with others.   Apply Dr. Ying pitt, Compound W, DuoFilm, Wart-off or other 17% salicylic acid-containing product to cover each wart.  Do not apply to normal surrounding skin.  Cover warts with duct tape. Most patients choose to apply this at bedtime and leave overnight.   Repeat the steps daily if possible.     What is NORMAL?   When the tape is removed, it may pull off dead layers of skin from the wart and surrounding normal skin.   A  whitish  color to the wart and surrounding normal skin is to be expected.    Stop treatment if skin becomes too irritated.   You should continue treatment until the warts are no longer present.

## 2023-08-22 NOTE — PROGRESS NOTES
[facility] Dermatology Note  Encounter Date: Aug 22, 2023  Office Visit     Dermatology Problem List:  1. Acne vulgaris  - Current tx: spironolactone, BPO wash, tretinoin  2. Labial lentigos, lower lip x2  - photo 2/28/2023  3. Cholinergic urticaria- Zyrtec    Last FBSE: 8/22/23    ____________________________________________    Assessment & Plan:    # Acne vulgaris, chronic, well controlled on tretinoin and spironolactone (through pmd)  -Continue tretinoin 0.1% cream at bedtime. Reviewed retinoid ed.   -Continue spirolactone daily.     # Verruca plantaris, right, new acute problem. .  Discussed underlying viral etiology of warts and treatment strategies that range from destructive to immune therapies. Treatment options including salicylic acid, imiquomod, Efudex, cimetidine, cryotherapy, cantheradone applications, candida injections, squaric acid treatment.    Start salicylic acid at night after any blistering resolves. Soak in warm water 10-15 minutes and use pumice stone to scrape away debris. Apply duct tape afterwards. Repeat nightly.       # Labial lentigos, lower lip x2, unchanged.   - Patient to monitor for future changes.  - Recommended Aquaphor chap stick with SPF.    # Seborrheic keratoses, R areola  Discussed the natural history and benign nature of this lesion. Reassurance provided that no additional treatment is necessary.     # Multiple benign nevi  - No concerning lesions today  - Monitor for ABCDEs of melanoma   - Continue sun protection - recommend SPF 30 or higher with frequent application   - Return sooner if noticing changing or symptomatic lesions    Procedures Performed:   None    Follow-up: annually(s) in-person, or earlier for new or changing lesions    Staff:   All risks, benefits and alternatives were discussed with patient.  Patient is in agreement and understands the assessment and plan.  All questions were answered.  Sun Screen Education was given.   Return to Clinic  annually or sooner as needed.   Bridget Becker PA-C   [facility]   ____________________________________________    CC: Skin Check (Talk about renewal of retin a cream for acne/)    HPI:  Ms. Ivet Ortiz is a(n) 22 year old female who presents today as a return patient  for a follow up of acne, a warts and skin check. She was last seen 2/28/23 when she had spot check. She had a photo taken of the lentigines on her lower lip.    Today, she states she would like a skin check today. No personal or family hx of skin cancer. No change in any lesions.     She needs a refill of tretinoin cream. She is tolerating this well.     She does have a wart and wonders what she should do with this.     Patient is otherwise feeling well, without additional skin concerns.    Labs Reviewed:  N/A    Physical Exam:  Vitals: There were no vitals taken for this visit.  SKIN: Skin exam is to her scalp, face, neck, ears, chest, abdomen, back, buttocks and upper and lower extremities including hands and feet.  Significant for:  - 3 mm light brown macule on the L lower lip.  - 3 mm x 3 mm light brown macule on the R lower lip  - Multiple regular brown pigmented macules and papules are identified on the trunk and extremities.   - There are waxy stuck on tan to brown papules on the R areola.  - Very few comedones on the face.  - Verrucous papule on the right plantar surface.   - No other lesions of concern on areas examined.         Medications:  Current Outpatient Medications   Medication    escitalopram (LEXAPRO) 5 MG tablet    montelukast (SINGULAIR) 10 MG tablet    spironolactone (ALDACTONE) 25 MG tablet    tretinoin (RETIN-A) 0.1 % external cream    cetirizine (ZYRTEC) 10 MG tablet     No current facility-administered medications for this visit.      Past Medical History:   There is no problem list on file for this patient.    Past Medical History:   Diagnosis Date    Depressive disorder         CC  Gay Lopez PA-C  [address] [place] [address] on close of this encounter.

## 2023-08-22 NOTE — LETTER
8/22/2023         RE: Ivet Ortiz  [address]  [place] [address]        Dear Colleague,    Thank you for referring your patient, Ivet Ortiz, to the [facility] [place]. Please see a copy of my visit note below.    [facility] Dermatology Note  Encounter Date: Aug 22, 2023  Office Visit     Dermatology Problem List:  1. Acne vulgaris  - Current tx: spironolactone, BPO wash, tretinoin  2. Labial lentigos, lower lip x2  - photo 2/28/2023  3. Cholinergic urticaria- Zyrtec    Last FBSE: 8/22/23    ____________________________________________    Assessment & Plan:    # Acne vulgaris, chronic, well controlled on tretinoin and spironolactone (through pmd)  -Continue tretinoin 0.1% cream at bedtime. Reviewed retinoid ed.   -Continue spirolactone daily.     # Verruca plantaris, right, new acute problem. .  Discussed underlying viral etiology of warts and treatment strategies that range from destructive to immune therapies. Treatment options including salicylic acid, imiquomod, Efudex, cimetidine, cryotherapy, cantheradone applications, candida injections, squaric acid treatment.    Start salicylic acid at night after any blistering resolves. Soak in warm water 10-15 minutes and use pumice stone to scrape away debris. Apply duct tape afterwards. Repeat nightly.       # Labial lentigos, lower lip x2, unchanged.   - Patient to monitor for future changes.  - Recommended Aquaphor chap stick with SPF.    # Seborrheic keratoses, R areola  Discussed the natural history and benign nature of this lesion. Reassurance provided that no additional treatment is necessary.     # Multiple benign nevi  - No concerning lesions today  - Monitor for ABCDEs of melanoma   - Continue sun protection - recommend SPF 30 or higher with frequent application   - Return sooner if noticing changing or symptomatic lesions    Procedures Performed:   None    Follow-up: annually(s) in-person, or  earlier for new or changing lesions    Staff:   All risks, benefits and alternatives were discussed with patient.  Patient is in agreement and understands the assessment and plan.  All questions were answered.  Sun Screen Education was given.   Return to Clinic annually or sooner as needed.   Bridget Becker PA-C   [facility]   ____________________________________________    CC: Skin Check (Talk about renewal of retin a cream for acne/)    HPI:  Ms. Ivet Ortiz is a(n) 22 year old female who presents today as a return patient  for a follow up of acne, a warts and skin check. She was last seen 2/28/23 when she had spot check. She had a photo taken of the lentigines on her lower lip.    Today, she states she would like a skin check today. No personal or family hx of skin cancer. No change in any lesions.     She needs a refill of tretinoin cream. She is tolerating this well.     She does have a wart and wonders what she should do with this.     Patient is otherwise feeling well, without additional skin concerns.    Labs Reviewed:  N/A    Physical Exam:  Vitals: There were no vitals taken for this visit.  SKIN: Skin exam is to her scalp, face, neck, ears, chest, abdomen, back, buttocks and upper and lower extremities including hands and feet.  Significant for:  - 3 mm light brown macule on the L lower lip.  - 3 mm x 3 mm light brown macule on the R lower lip  - Multiple regular brown pigmented macules and papules are identified on the trunk and extremities.   - There are waxy stuck on tan to brown papules on the R areola.  - Very few comedones on the face.  - Verrucous papule on the right plantar surface.   - No other lesions of concern on areas examined.         Medications:  Current Outpatient Medications   Medication     escitalopram (LEXAPRO) 5 MG tablet     montelukast (SINGULAIR) 10 MG tablet     spironolactone (ALDACTONE) 25 MG tablet     tretinoin (RETIN-A) 0.1 % external  cream     cetirizine (ZYRTEC) 10 MG tablet     No current facility-administered medications for this visit.      Past Medical History:   There is no problem list on file for this patient.    Past Medical History:   Diagnosis Date     Depressive disorder         CC Gay Lopez PA-C  [address] [place] [address] [place] [address] on close of this encounter.      Again, thank you for allowing me to participate in the care of your patient.        Sincerely,        Bridget Becker PA-C

## 2023-09-14 DIAGNOSIS — J30.89 ALLERGIC RHINITIS DUE TO DUST MITE: ICD-10-CM

## 2023-09-14 DIAGNOSIS — J30.1 SEASONAL ALLERGIC RHINITIS DUE TO POLLEN: ICD-10-CM

## 2023-09-14 RX ORDER — MONTELUKAST SODIUM 10 MG/1
10 TABLET ORAL AT BEDTIME
Qty: 90 TABLET | Refills: 0 | Status: SHIPPED | OUTPATIENT
Start: 2023-09-14 | End: 2023-10-23

## 2023-10-22 DIAGNOSIS — J30.1 SEASONAL ALLERGIC RHINITIS DUE TO POLLEN: ICD-10-CM

## 2023-10-22 DIAGNOSIS — J30.89 ALLERGIC RHINITIS DUE TO DUST MITE: ICD-10-CM

## 2023-10-23 RX ORDER — MONTELUKAST SODIUM 10 MG/1
1 TABLET ORAL
Qty: 30 TABLET | Refills: 0 | Status: SHIPPED | OUTPATIENT
Start: 2023-10-23 | End: 2023-11-08

## 2023-10-26 ENCOUNTER — MYC MEDICAL ADVICE (OUTPATIENT)
Dept: FAMILY MEDICINE | Facility: CLINIC | Age: 23
End: 2023-10-26
Payer: COMMERCIAL

## 2023-10-26 DIAGNOSIS — L70.0 ACNE VULGARIS: ICD-10-CM

## 2023-10-26 NOTE — TELEPHONE ENCOUNTER
[employer] messages sent to patient to set up medication follow up .       Mireya Albert RN  [facility]

## 2023-10-30 RX ORDER — SPIRONOLACTONE 25 MG/1
75 TABLET ORAL DAILY
Qty: 270 TABLET | Refills: 1 | Status: SHIPPED | OUTPATIENT
Start: 2023-10-30 | End: 2024-04-30

## 2023-11-08 ENCOUNTER — VIRTUAL VISIT (OUTPATIENT)
Dept: FAMILY MEDICINE | Facility: CLINIC | Age: 23
End: 2023-11-08
Payer: COMMERCIAL

## 2023-11-08 DIAGNOSIS — J30.89 ALLERGIC RHINITIS DUE TO DUST MITE: ICD-10-CM

## 2023-11-08 DIAGNOSIS — F41.1 GAD (GENERALIZED ANXIETY DISORDER): ICD-10-CM

## 2023-11-08 DIAGNOSIS — F33.42 RECURRENT MAJOR DEPRESSIVE DISORDER, IN FULL REMISSION (H): ICD-10-CM

## 2023-11-08 DIAGNOSIS — J30.1 SEASONAL ALLERGIC RHINITIS DUE TO POLLEN: ICD-10-CM

## 2023-11-08 PROCEDURE — 96127 BRIEF EMOTIONAL/BEHAV ASSMT: CPT | Performed by: PHYSICIAN ASSISTANT

## 2023-11-08 PROCEDURE — 99214 OFFICE O/P EST MOD 30 MIN: CPT | Mod: VID | Performed by: PHYSICIAN ASSISTANT

## 2023-11-08 RX ORDER — LEVOCETIRIZINE DIHYDROCHLORIDE 5 MG/1
5 TABLET, FILM COATED ORAL EVERY EVENING
COMMUNITY

## 2023-11-08 RX ORDER — MONTELUKAST SODIUM 10 MG/1
1 TABLET ORAL AT BEDTIME
Qty: 90 TABLET | Refills: 1 | Status: SHIPPED | OUTPATIENT
Start: 2023-11-08

## 2023-11-08 RX ORDER — ESCITALOPRAM OXALATE 5 MG/1
5 TABLET ORAL DAILY
Qty: 90 TABLET | Refills: 2 | Status: SHIPPED | OUTPATIENT
Start: 2023-11-08

## 2023-11-08 ASSESSMENT — PATIENT HEALTH QUESTIONNAIRE - PHQ9: SUM OF ALL RESPONSES TO PHQ QUESTIONS 1-9: 1

## 2023-11-08 NOTE — PROGRESS NOTES
Ivet is a 23 year old who is being evaluated via a billable video visit.      How would you like to obtain your AVS? MyChart  If the video visit is dropped, the invitation should be resent by: Text to cell phone: [phone]  Will anyone else be joining your video visit? No      Assessment & Plan     Allergic rhinitis due to dust mite  Seasonal allergic rhinitis due to pollen  - trial claritin 10 mg once or twice daily  - refill montelukast (SINGULAIR) 10 MG tablet  Dispense: 90 tablet; Refill: 1    Recurrent major depressive disorder, in full remission (H24)  CORRINE (generalized anxiety disorder)  Stable currently. Plans to move back to [place] where she's from, family and friends all there. Would like to go off lexapro once in [place]. Suggested waiting until settled after move, then may stop.  - escitalopram (LEXAPRO) 5 MG tablet  Dispense: 90 tablet; Refill: 2    Follow up as needed    Leisa Lenz PA-C  [facility]   Ivet is a 23 year old, presenting for the following health issues:  Recheck Medication        11/8/2023     3:46 PM   Additional Questions   Roomed by Raji TAN       History of Present Illness       Reason for visit:  Follow up for med refills    She eats 4 or more servings of fruits and vegetables daily.She consumes 0 sweetened beverage(s) daily.She exercises with enough effort to increase her heart rate 30 to 60 minutes per day.  She exercises with enough effort to increase her heart rate 7 days per week.   She is taking medications regularly.     On spironolactone for acne since 2019  CMP within normal limits last year     Allergies bad in summer  Throat itchy, wakes her at night, sneezing all the time  Tried zyrtec BID plus singulair without full control  Now on xyzal and singulair     Depression Followup  How are you doing with your depression since your last visit? Improved would like to discuss stopping after winter   Are you having other symptoms that might be  associated with depression? No  Have you had a significant life event?  OTHER: will be moving to [place] after winter    Are you feeling anxious or having panic attacks?   No  On lexapro 5 mg daily since 3/2022 - finds it helpful but doesn't want to take forever. Winter is hardest so doesn't want to make changes now but would like to   Moving back to [place] in May where she's from - all family and friends are there      Social History     Tobacco Use    Smoking status: Never    Smokeless tobacco: Never   Vaping Use    Vaping Use: Never used         12/29/2022     4:09 PM 6/7/2023     9:56 PM 11/8/2023     3:49 PM   PHQ   PHQ-9 Total Score 2 2 1   Q9: Thoughts of better off dead/self-harm past 2 weeks Not at all Not at all Not at all          No data to display                  11/8/2023     3:49 PM   Last PHQ-9   1.  Little interest or pleasure in doing things 0   2.  Feeling down, depressed, or hopeless 1   3.  Trouble falling or staying asleep, or sleeping too much 0   4.  Feeling tired or having little energy 0   5.  Poor appetite or overeating 0   6.  Feeling bad about yourself 0   7.  Trouble concentrating 0   8.  Moving slowly or restless 0   Q9: Thoughts of better off dead/self-harm past 2 weeks 0   PHQ-9 Total Score 1          No data to display              Review of Systems   Constitutional, HEENT, cardiovascular, pulmonary, gi and gu systems are negative, except as otherwise noted.      Objective           Vitals:  No vitals were obtained today due to virtual visit.    Physical Exam   GENERAL: Healthy, alert and no distress  EYES: Eyes grossly normal to inspection.  No discharge or erythema, or obvious scleral/conjunctival abnormalities.  RESP: No audible wheeze, cough, or visible cyanosis.  No visible retractions or increased work of breathing.    SKIN: Visible skin clear. No significant rash, abnormal pigmentation or lesions.  NEURO: Cranial nerves grossly intact.  Mentation and speech appropriate for  age.  PSYCH: Mentation appears normal, affect normal/bright, judgement and insight intact, normal speech and appearance well-groomed.        Video-Visit Details    Type of service:  Video Visit   Video start time: 4:58 PM   Video end time: 5:11 PM      Originating Location (pt. Location): Home  Distant Location (provider location):  On-site  Platform used for Video Visit: Mallorie

## 2024-02-14 SDOH — HEALTH STABILITY: PHYSICAL HEALTH: ON AVERAGE, HOW MANY MINUTES DO YOU ENGAGE IN EXERCISE AT THIS LEVEL?: 150+ MIN

## 2024-02-14 SDOH — HEALTH STABILITY: PHYSICAL HEALTH: ON AVERAGE, HOW MANY DAYS PER WEEK DO YOU ENGAGE IN MODERATE TO STRENUOUS EXERCISE (LIKE A BRISK WALK)?: 7 DAYS

## 2024-02-14 ASSESSMENT — SOCIAL DETERMINANTS OF HEALTH (SDOH): HOW OFTEN DO YOU GET TOGETHER WITH FRIENDS OR RELATIVES?: ONCE A WEEK

## 2024-03-10 ENCOUNTER — HEALTH MAINTENANCE LETTER (OUTPATIENT)
Age: 24
End: 2024-03-10

## 2024-04-23 SDOH — HEALTH STABILITY: PHYSICAL HEALTH: ON AVERAGE, HOW MANY DAYS PER WEEK DO YOU ENGAGE IN MODERATE TO STRENUOUS EXERCISE (LIKE A BRISK WALK)?: 7 DAYS

## 2024-04-23 SDOH — HEALTH STABILITY: PHYSICAL HEALTH: ON AVERAGE, HOW MANY MINUTES DO YOU ENGAGE IN EXERCISE AT THIS LEVEL?: 150+ MIN

## 2024-04-23 ASSESSMENT — SOCIAL DETERMINANTS OF HEALTH (SDOH): HOW OFTEN DO YOU GET TOGETHER WITH FRIENDS OR RELATIVES?: ONCE A WEEK

## 2024-04-30 ENCOUNTER — OFFICE VISIT (OUTPATIENT)
Dept: FAMILY MEDICINE | Facility: CLINIC | Age: 24
End: 2024-04-30
Payer: COMMERCIAL

## 2024-04-30 VITALS
SYSTOLIC BLOOD PRESSURE: 137 MMHG | OXYGEN SATURATION: 97 % | DIASTOLIC BLOOD PRESSURE: 84 MMHG | WEIGHT: 145 LBS | BODY MASS INDEX: 22.76 KG/M2 | HEIGHT: 67 IN | TEMPERATURE: 98.2 F | RESPIRATION RATE: 18 BRPM | HEART RATE: 87 BPM

## 2024-04-30 DIAGNOSIS — L70.0 ACNE VULGARIS: ICD-10-CM

## 2024-04-30 DIAGNOSIS — Z13.6 CARDIOVASCULAR SCREENING; LDL GOAL LESS THAN 130: ICD-10-CM

## 2024-04-30 DIAGNOSIS — F41.1 GENERALIZED ANXIETY DISORDER: ICD-10-CM

## 2024-04-30 DIAGNOSIS — Z00.00 ANNUAL PHYSICAL EXAM: Primary | ICD-10-CM

## 2024-04-30 DIAGNOSIS — R53.83 FATIGUE, UNSPECIFIED TYPE: ICD-10-CM

## 2024-04-30 PROCEDURE — 82306 VITAMIN D 25 HYDROXY: CPT

## 2024-04-30 PROCEDURE — 80061 LIPID PANEL: CPT

## 2024-04-30 PROCEDURE — 36415 COLL VENOUS BLD VENIPUNCTURE: CPT

## 2024-04-30 PROCEDURE — 99395 PREV VISIT EST AGE 18-39: CPT

## 2024-04-30 PROCEDURE — 84443 ASSAY THYROID STIM HORMONE: CPT

## 2024-04-30 PROCEDURE — 99214 OFFICE O/P EST MOD 30 MIN: CPT | Mod: 25

## 2024-04-30 PROCEDURE — 80053 COMPREHEN METABOLIC PANEL: CPT

## 2024-04-30 RX ORDER — SPIRONOLACTONE 25 MG/1
75 TABLET ORAL DAILY
Qty: 270 TABLET | Refills: 0 | Status: SHIPPED | OUTPATIENT
Start: 2024-04-30

## 2024-04-30 ASSESSMENT — PATIENT HEALTH QUESTIONNAIRE - PHQ9: SUM OF ALL RESPONSES TO PHQ QUESTIONS 1-9: 2

## 2024-04-30 ASSESSMENT — PAIN SCALES - GENERAL: PAINLEVEL: NO PAIN (0)

## 2024-04-30 NOTE — PROGRESS NOTES
Preventive Care Visit  [facility] MARSHA Rojas DNP, Geriatric Medicine  Apr 30, 2024      Assessment & Plan     Annual physical exam  Will update labs per request, will defer vaccines until she is able to check immunization record. Normal pap done last year, repeat in 2026  - Comprehensive metabolic panel    Fatigue, unspecified type  Interested in checking thyroid. Will also look into vitamin d deficiency; not currently taking supplement  - TSH with free T4 reflex  - Vitamin D Deficiency    Acne vulgaris  Stable on spironolactone, will continue  - spironolactone (ALDACTONE) 25 MG tablet; Take 3 tablets (75 mg) by mouth daily    Generalized anxiety disorder  Stable on lexapro, plans to wean over 2-4 weeks this summer once back in [place]    CARDIOVASCULAR SCREENING; LDL GOAL LESS THAN 130  Family hx of hyperlipidemia, will screen  - Lipid Profile    Patient has been advised of split billing requirements and indicates understanding: Yes    25 minutes spent by me on the date of the encounter doing chart review, history and exam, documentation and further activities per the note      Counseling  Appropriate preventive services were discussed with this patient, including applicable screening as appropriate for fall prevention, nutrition, physical activity, Tobacco-use cessation, weight loss and cognition.  Checklist reviewing preventive services available has been given to the patient.  Reviewed patient's diet, addressing concerns and/or questions.       FUTURE APPOINTMENTS:       - Follow-up for annual visit or as needed    Ness Cooney is a 23 year old, presenting for the following:  Physical        Health Care Directive  Patient does not have a Health Care Directive or Living Will: Discussed advance care planning with patient; however, patient declined at this time.    Having some fatigue on and off for a while  History of elevated liver enzymes in the past, had workup done and was told it  was likely a medication reaction  Family history of hyperlipidemia  Moved from [place] a couple years ago, planning to move back next month                  4/23/2024   General Health   How would you rate your overall physical health? Excellent   Feel stress (tense, anxious, or unable to sleep) Not at all         4/23/2024   Nutrition   Three or more servings of calcium each day? Yes   Diet: Regular (no restrictions)   How many servings of fruit and vegetables per day? 4 or more   How many sweetened beverages each day? 0-1         4/23/2024   Exercise   Days per week of moderate/strenous exercise 7 days   Average minutes spent exercising at this level 150+ min         4/23/2024   Social Factors   Frequency of gathering with friends or relatives Once a week   Worry food won't last until get money to buy more No   Food not last or not have enough money for food? No   Do you have housing?  Yes   Are you worried about losing your housing? No   Lack of transportation? No   Unable to get utilities (heat,electricity)? No         4/23/2024   Dental   Dentist two times every year? Yes          Today's PHQ-9 Score:       4/30/2024     4:22 PM   PHQ-9 SCORE   PHQ-9 Total Score 2           4/23/2024   Substance Use   Alcohol more than 3/day or more than 7/wk No   Do you use any other substances recreationally? No     Social History     Tobacco Use    Smoking status: Never    Smokeless tobacco: Never   Vaping Use    Vaping status: Never Used           4/23/2024   Breast Cancer Screening   Family history of breast, colon, or ovarian cancer? Yes         4/23/2024   LAST FHS-7 RESULTS   1st degree relative breast or ovarian cancer Yes   Any relative bilateral breast cancer Unknown   Any male have breast cancer No   Any ONE woman have BOTH breast AND ovarian cancer No   Any woman with breast cancer before 50yrs Yes   2 or more relatives with breast AND/OR ovarian cancer No   2 or more relatives with breast AND/OR bowel cancer No  "      Mammogram Screening - Patient under 40 years of age: Routine Mammogram Screening not recommended.         4/23/2024   STI Screening   New sexual partner(s) since last STI/HIV test? No     History of abnormal Pap smear: NO - age 30- 65 PAP every 3 years recommended        2/7/2023     4:05 PM   PAP / HPV   PAP Negative for Intraepithelial Lesion or Malignancy (NILM)            4/23/2024   Contraception/Family Planning   Questions about contraception or family planning No       Reviewed and updated as needed this visit by Provider   Tobacco   Meds  Problems  Med Hx  Surg Hx  Fam Hx            Past Medical History:   Diagnosis Date    Depressive disorder      History reviewed. No pertinent surgical history.  Lab work is in process      Review of Systems  Constitutional, HEENT, cardiovascular, pulmonary, gi and gu systems are negative, except as otherwise noted.     Objective    Exam  /84 (BP Location: Right arm, Patient Position: Sitting, Cuff Size: Adult Regular)   Pulse 87   Temp 98.2  F (36.8  C) (Temporal)   Resp 18   Ht 1.689 m (5' 6.5\")   Wt 65.8 kg (145 lb)   SpO2 97%   BMI 23.05 kg/m     Estimated body mass index is 23.05 kg/m  as calculated from the following:    Height as of this encounter: 1.689 m (5' 6.5\").    Weight as of this encounter: 65.8 kg (145 lb).    Physical Exam  Vitals reviewed.   HENT:      Head: Normocephalic.      Right Ear: Tympanic membrane, ear canal and external ear normal.      Left Ear: Tympanic membrane, ear canal and external ear normal.   Eyes:      Pupils: Pupils are equal, round, and reactive to light.   Cardiovascular:      Rate and Rhythm: Normal rate and regular rhythm.      Pulses: Normal pulses.      Heart sounds: Normal heart sounds. No murmur heard.  Pulmonary:      Effort: Pulmonary effort is normal. No respiratory distress.      Breath sounds: Normal breath sounds. No wheezing, rhonchi or rales.   Chest:   Breasts:     Right: Normal.      Left: " Normal.   Abdominal:      General: Bowel sounds are normal. There is no distension.      Palpations: Abdomen is soft. There is no mass.      Tenderness: There is no abdominal tenderness.   Musculoskeletal:         General: Normal range of motion.      Cervical back: Normal range of motion and neck supple.      Right lower leg: No edema.      Left lower leg: No edema.   Skin:     General: Skin is warm and dry.   Neurological:      Mental Status: She is alert and oriented to person, place, and time.   Psychiatric:         Mood and Affect: Mood normal.         Behavior: Behavior normal.               Signed Electronically by: Leisa Rojas, DNP, APRN, CNP

## 2024-05-01 LAB
ALBUMIN SERPL BCG-MCNC: 4.6 G/DL (ref 3.5–5.2)
ALP SERPL-CCNC: 53 U/L (ref 40–150)
ALT SERPL W P-5'-P-CCNC: 16 U/L (ref 0–50)
ANION GAP SERPL CALCULATED.3IONS-SCNC: 10 MMOL/L (ref 7–15)
AST SERPL W P-5'-P-CCNC: 21 U/L (ref 0–45)
BILIRUB SERPL-MCNC: 0.2 MG/DL
BUN SERPL-MCNC: 15.3 MG/DL (ref 6–20)
CALCIUM SERPL-MCNC: 9.2 MG/DL (ref 8.6–10)
CHLORIDE SERPL-SCNC: 101 MMOL/L (ref 98–107)
CHOLEST SERPL-MCNC: 194 MG/DL
CREAT SERPL-MCNC: 0.76 MG/DL (ref 0.51–0.95)
DEPRECATED HCO3 PLAS-SCNC: 27 MMOL/L (ref 22–29)
EGFRCR SERPLBLD CKD-EPI 2021: >90 ML/MIN/1.73M2
FASTING STATUS PATIENT QL REPORTED: NO
GLUCOSE SERPL-MCNC: 94 MG/DL (ref 70–99)
HDLC SERPL-MCNC: 49 MG/DL
LDLC SERPL CALC-MCNC: 121 MG/DL
NONHDLC SERPL-MCNC: 145 MG/DL
POTASSIUM SERPL-SCNC: 4 MMOL/L (ref 3.4–5.3)
PROT SERPL-MCNC: 7.1 G/DL (ref 6.4–8.3)
SODIUM SERPL-SCNC: 138 MMOL/L (ref 135–145)
TRIGL SERPL-MCNC: 118 MG/DL
TSH SERPL DL<=0.005 MIU/L-ACNC: 1.6 UIU/ML (ref 0.3–4.2)
VIT D+METAB SERPL-MCNC: 17 NG/ML (ref 20–50)